# Patient Record
Sex: FEMALE | Race: WHITE | NOT HISPANIC OR LATINO
[De-identification: names, ages, dates, MRNs, and addresses within clinical notes are randomized per-mention and may not be internally consistent; named-entity substitution may affect disease eponyms.]

---

## 2019-02-14 PROBLEM — M25.561 RIGHT KNEE PAIN, UNSPECIFIED CHRONICITY: Status: ACTIVE | Noted: 2019-02-14

## 2019-02-15 ENCOUNTER — APPOINTMENT (OUTPATIENT)
Dept: ORTHOPEDIC SURGERY | Facility: CLINIC | Age: 59
End: 2019-02-15
Payer: OTHER MISCELLANEOUS

## 2019-02-15 VITALS — BODY MASS INDEX: 30.99 KG/M2 | HEIGHT: 65 IN | WEIGHT: 186 LBS

## 2019-02-15 VITALS — DIASTOLIC BLOOD PRESSURE: 85 MMHG | SYSTOLIC BLOOD PRESSURE: 127 MMHG | HEART RATE: 98 BPM

## 2019-02-15 DIAGNOSIS — M25.561 PAIN IN RIGHT KNEE: ICD-10-CM

## 2019-02-15 DIAGNOSIS — M17.11 UNILATERAL PRIMARY OSTEOARTHRITIS, RIGHT KNEE: ICD-10-CM

## 2019-02-15 DIAGNOSIS — Z87.39 PERSONAL HISTORY OF OTHER DISEASES OF THE MUSCULOSKELETAL SYSTEM AND CONNECTIVE TISSUE: ICD-10-CM

## 2019-02-15 PROCEDURE — 73562 X-RAY EXAM OF KNEE 3: CPT | Mod: RT

## 2019-02-15 PROCEDURE — 99204 OFFICE O/P NEW MOD 45 MIN: CPT

## 2019-02-15 NOTE — HISTORY OF PRESENT ILLNESS
[de-identified] : Patient presents today for initial evaluation of right knee pain. The patient reports a developing pain in the right knee as a result of sustaining a work-related injury April 21, 2012. 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 She states since that time, she followup with orthopedist. She's had corticosteroid injections as well as arthroscopy in the past year. She continues to have knee pain. She reports the knee is now not need. Ports of pain in the lateral aspect of his knee. She has great difficulties in ascending descending stairs. She cannot reciprocate. She reports with difficulties walking long distances. She is intermittently using a cane at this time. She has extensive food allergies and cannot take Tylenol for her pain because of allergy. She reports her pain is progressively worsening. She has crepitus. She is here today to discuss surgical management for knee replacement. No history of metal allergies reported. No past history of DVT or PE.\par \par Review of Systems-\par Constitutional: No fever or chills. \par Cardiovascular: No orthopnea or chest pain\par Pulmonary: No shortness of breath. \par GI: No nausea or vomiting or abdominal pain.\par Musculoskeletal: see HPI \par Psychiatric: No anxiety and depression.

## 2019-02-15 NOTE — PHYSICAL EXAM
[de-identified] : The patient appears well nourished and in no apparent distress. The patient is alert and oriented to person, place, and time. Affect and mood appear normal. The head is normocephalic and atraumatic. The eyes reveal normal sclera and extra ocular muscles are intact. The mucous membranes are moist. Skin shows normal turgor with no evidence of eczema or psoriasis. No respiratory distress noted. Sensation grossly intact. MUSCULOSKELETAL:   SEE BELOW\par \par \par Right  Knee: Small portals consistent with past arthroscopy; no large surgical scars \par Swelling: Mild\par Effusion: Minimal\par Alignment: -7° of valgus malalignment\par Tenderness: Anterior and lateral joint lines\par ROM: Flexion: 125 deg.; Extension: Zero deg,\par Laxity: There is valgus instability of 5°; no anterior posterior instability\par PF crepitus: yes;  mild pain\par Quadricep formation: intact in Extension & Flexion:\par Quad/Ham St: 5/5\par Venous stasis. No ulcerations. 2+ DP pulse. [de-identified] : X-ray of the right knee was reviewed. There is a severe clinical valgus to fracture appreciated. There is osteophyte formation as well as calcifications of the knee. There is bone-on-bone DJD of the patellofemoral joint and the lateral compartment.

## 2020-09-15 ENCOUNTER — NON-APPOINTMENT (OUTPATIENT)
Age: 60
End: 2020-09-15

## 2020-09-15 ENCOUNTER — APPOINTMENT (OUTPATIENT)
Dept: CARDIOLOGY | Facility: CLINIC | Age: 60
End: 2020-09-15
Payer: MEDICAID

## 2020-09-15 VITALS
BODY MASS INDEX: 28.82 KG/M2 | WEIGHT: 173 LBS | DIASTOLIC BLOOD PRESSURE: 80 MMHG | RESPIRATION RATE: 15 BRPM | HEIGHT: 65 IN | TEMPERATURE: 97.6 F | HEART RATE: 92 BPM | OXYGEN SATURATION: 90 % | SYSTOLIC BLOOD PRESSURE: 120 MMHG

## 2020-09-15 DIAGNOSIS — Z82.49 FAMILY HISTORY OF ISCHEMIC HEART DISEASE AND OTHER DISEASES OF THE CIRCULATORY SYSTEM: ICD-10-CM

## 2020-09-15 DIAGNOSIS — Z87.898 PERSONAL HISTORY OF OTHER SPECIFIED CONDITIONS: ICD-10-CM

## 2020-09-15 PROCEDURE — 93970 EXTREMITY STUDY: CPT

## 2020-09-15 PROCEDURE — 93306 TTE W/DOPPLER COMPLETE: CPT

## 2020-09-15 PROCEDURE — 93000 ELECTROCARDIOGRAM COMPLETE: CPT

## 2020-09-15 PROCEDURE — 99204 OFFICE O/P NEW MOD 45 MIN: CPT

## 2020-09-15 RX ORDER — LORAZEPAM 0.5 MG/1
0.5 TABLET ORAL
Refills: 0 | Status: ACTIVE | COMMUNITY

## 2020-09-15 RX ORDER — METOPROLOL TARTRATE 25 MG/1
25 TABLET, FILM COATED ORAL DAILY
Refills: 0 | Status: DISCONTINUED | COMMUNITY
End: 2020-09-15

## 2020-09-15 RX ORDER — CHROMIUM 200 MCG
TABLET ORAL
Refills: 0 | Status: DISCONTINUED | COMMUNITY
End: 2020-09-15

## 2020-09-15 RX ORDER — FAMOTIDINE 20 MG/1
20 TABLET, FILM COATED ORAL
Refills: 0 | Status: ACTIVE | COMMUNITY

## 2020-09-16 NOTE — REASON FOR VISIT
[FreeTextEntry1] : 60-year-old female present here for initial cardiac evaluation status-post a hospitalization in August with paroxysmal atrial fibrillation. \par \par The patient, who has a diagnosis of pancreatic cancer, developed severe and protracted nausea and vomiting with dehydration resulting in hospitalization for the above.  She apparently developed paroxysmal atrial fibrillation during that hospitalization, and it would seem that it spontaneously converted back to sinus rhythm.  She is not being treated with anticoagulation presumptively because of the relative low CHADS score and the underlying malignancy.  \par \par The patient has no preexisting history of any cardiovascular disease.  She does not smoke or drink.  She does not have any hypertension, diabetes, or hyperlipidemia.  There is no preexisting history of valvular disease.  \par \par Patient just began another chemo regimen being registered through the [facility] site.  Her pancreatic lesion will apparently be considered for a surgery at a later date. \par \par

## 2020-09-16 NOTE — PHYSICAL EXAM
[General Appearance - Well Developed] : well developed [Normal Appearance] : normal appearance [Normal Conjunctiva] : the conjunctiva exhibited no abnormalities [Well Groomed] : well groomed [Eyelids - No Xanthelasma] : the eyelids demonstrated no xanthelasmas [Normal Oral Mucosa] : normal oral mucosa [No Oral Cyanosis] : no oral cyanosis [No Oral Pallor] : no oral pallor [Normal Jugular Venous A Waves Present] : normal jugular venous A waves present [Normal Jugular Venous V Waves Present] : normal jugular venous V waves present [No Jugular Venous Wild A Waves] : no jugular venous wild A waves [Heart Rate And Rhythm] : heart rate and rhythm were normal [Murmurs] : no murmurs present [Heart Sounds] : normal S1 and S2 [Respiration, Rhythm And Depth] : normal respiratory rhythm and effort [Exaggerated Use Of Accessory Muscles For Inspiration] : no accessory muscle use [Auscultation Breath Sounds / Voice Sounds] : lungs were clear to auscultation bilaterally [Abdomen Soft] : soft [Abdomen Tenderness] : non-tender [] : no hepato-splenomegaly [Abdomen Mass (___ Cm)] : no abdominal mass palpated [Oriented To Time, Place, And Person] : oriented to person, place, and time [Mood] : the mood was normal [Affect] : the affect was normal [No Anxiety] : not feeling anxious [FreeTextEntry1] : Right knee with incisional scar\par 1-2+ left lower extremity edema to the knee

## 2020-09-16 NOTE — ASSESSMENT
[FreeTextEntry1] : The ECG shows normal sinus rhythm at 92 with no significant ST-T wave changes. \par \par The echocardiogram dated 8/9/20 at [facility] [place] demonstrated normal LV size and function and absent of any significant valvular heart disease or pulmonary hypertension.  \par \par Laboratory data otherwise is noncontributory/not available. \par \par Impression:\par 1.  Paroxysmal atrial fibrillation seen in the face of dehydration and possible electrolyte abnormalities. \par 2.  Structurally normal heart by echocardiography with no cardiac abnormalities seen on examination or ECG today. \par 3.  Lower extremity edema more so unilateral in the left lower extremity with a venous Doppler performed today showing no evidence of DVT.  \par \par I reassured the patient that there is no evidence of DVT and that the edema is more likely on the basis of being volume-overloaded and perhaps hypoalbuminemic.  Support stockings might be useful for this. \par \par As regards to paroxysmal atrial fibrillation that was seen in the face of the severe dehydration with nausea and vomiting, would agree with avoiding any long-term anticoagulation at this point in time. \par \par Would switch her over from short-acting to long-acting Metoprolol Succinate 25 mg once daily. \par \par Would consider use of aspirin 81 mg daily if otherwise not contraindicated.  There is no indication for any other cardiac testing at this point in time.  Patient is to return to you for ongoing medical care.  I would like to check on her again in a few months.  Feel free to contact me should you think she will be going for surgery.    \par

## 2020-10-20 ENCOUNTER — RX RENEWAL (OUTPATIENT)
Age: 60
End: 2020-10-20

## 2020-11-12 ENCOUNTER — APPOINTMENT (OUTPATIENT)
Dept: CARDIOLOGY | Facility: CLINIC | Age: 60
End: 2020-11-12
Payer: MEDICAID

## 2020-11-12 VITALS
BODY MASS INDEX: 32.32 KG/M2 | WEIGHT: 194 LBS | TEMPERATURE: 96.9 F | HEIGHT: 65 IN | RESPIRATION RATE: 16 BRPM | HEART RATE: 102 BPM | OXYGEN SATURATION: 97 % | SYSTOLIC BLOOD PRESSURE: 121 MMHG | DIASTOLIC BLOOD PRESSURE: 80 MMHG

## 2020-11-12 PROCEDURE — 93000 ELECTROCARDIOGRAM COMPLETE: CPT

## 2020-11-12 PROCEDURE — 99214 OFFICE O/P EST MOD 30 MIN: CPT

## 2020-11-12 PROCEDURE — 99072 ADDL SUPL MATRL&STAF TM PHE: CPT

## 2020-11-12 RX ORDER — METOPROLOL SUCCINATE 25 MG/1
25 TABLET, EXTENDED RELEASE ORAL DAILY
Qty: 90 | Refills: 3 | Status: DISCONTINUED | COMMUNITY
Start: 2020-09-15 | End: 2020-11-12

## 2020-11-12 NOTE — HISTORY OF PRESENT ILLNESS
[FreeTextEntry1] : Her hx is as followed.\par Newly diagnosed with  pancreatic cancer, developed severe and protracted nausea and vomiting with dehydration hospitalized.  She apparently developed PAF during that hospitalization, and spontaneously converted back to SR. She is not being treated with anticoagulation presumptively because of the relative low CHADS score and the underlying malignancy.  \par \par The patient has no preexisting history of any cardiovascular disease.  She does not smoke or drink.  She does not have any hypertension, diabetes, or hyperlipidemia.  There is no preexisting history of valvular disease.  \par

## 2020-11-12 NOTE — REASON FOR VISIT
[FreeTextEntry1] : 60-year-old female present here for [place] reevaluation for hx of  paroxysmal atrial fibrillation. \par Was initially on metoprolol tartrate.  We replaced it with metoprolol succinate\par After our last visit she developed SOB soon after starting Metoprolol succ.  \par This was changed back to tartrate and symptom resolved. \par \par More recently she was hospitalized for 2 weeks due to adverse rx to a new chemo which caused N/V.\par \par During her last tx at [facility] she was given IV Lasix for worsening l/e edema and sent home on 40 mg BID for 1 week. Potassium PO was not started. Next blood work is due next Tuesday. \par \par By her report her CT scan revealed a decrease in size  by half of her pancreatic tumor. The details of this are not available for review.

## 2020-11-12 NOTE — ASSESSMENT
[FreeTextEntry1] : ECG- ST at 104 bpm wit occ. PVCs.\par \par The echocardiogram dated 8/9/20 at [facility] [place] demonstrated normal LV size and function and absent of any significant valvular heart disease or pulmonary hypertension.  \par \par Lab data 9/15/20\par \par \par \par Impression:\par 1.  Paroxysmal atrial fibrillation seen in the face of dehydration and possible electrolyte abnormalities which has not clinically reoccurred. \par 2.  Structurally normal heart by echocardiography with no cardiac abnormalities seen on examination or ECG today. \par 3.  Worsening l/e edema and started on Lasix 40 mg BID by onc. with no PO K supplementation. Up 21 lbs since Sept. suspect that most of this  is  fluid retention. \par \par 4. Blood pressure today fine. HR ST at 104 bpm on Metoprolol tar. 25 mg daily. Last dose was taken 8 hrs ago. \par \par 5. Pancreatic CA being managed through [facility] and now tolerating new chemo regimen.\par \par 6. No SOB or chest pain.\par \par \par Plan\par 1. Increase Metoprolol Tartrate  to 25 mg BID for better HR control. \par \par 2. RX'd  Potassium  20 MEQs daily for duration of Lasix use. Would like a copy of next blood work. \par     Suspect the patient will require ongoing diuretic therapy.\par    suggested testing of an albumin in addition to a metabolic panel on follow-up next week\par \par 3.As regards to paroxysmal atrial fibrillation that was seen in the face of the severe dehydration with nausea and vomiting, would agree with avoiding any long-term anticoagulation at this point in time. \par \par \par  There is no indication for any other cardiac testing at this point in time.  \par \par Clinical follow up in 2 months.

## 2020-11-12 NOTE — PHYSICAL EXAM
[General Appearance - Well Developed] : well developed [Normal Appearance] : normal appearance [Well Groomed] : well groomed [Normal Conjunctiva] : the conjunctiva exhibited no abnormalities [Eyelids - No Xanthelasma] : the eyelids demonstrated no xanthelasmas [Normal Oral Mucosa] : normal oral mucosa [No Oral Pallor] : no oral pallor [No Oral Cyanosis] : no oral cyanosis [Normal Jugular Venous A Waves Present] : normal jugular venous A waves present [Normal Jugular Venous V Waves Present] : normal jugular venous V waves present [No Jugular Venous Wild A Waves] : no jugular venous wild A waves [Respiration, Rhythm And Depth] : normal respiratory rhythm and effort [Exaggerated Use Of Accessory Muscles For Inspiration] : no accessory muscle use [Auscultation Breath Sounds / Voice Sounds] : lungs were clear to auscultation bilaterally [Heart Rate And Rhythm] : heart rate and rhythm were normal [Heart Sounds] : normal S1 and S2 [Murmurs] : no murmurs present [Abdomen Soft] : soft [Abdomen Tenderness] : non-tender [] : no hepato-splenomegaly [Abdomen Mass (___ Cm)] : no abdominal mass palpated [Oriented To Time, Place, And Person] : oriented to person, place, and time [Affect] : the affect was normal [Mood] : the mood was normal [No Anxiety] : not feeling anxious [FreeTextEntry1] : Right knee with incisional scar.  1-2+ pretibial edema bilaterally. \par 1-2+ left lower extremity edema to the knee

## 2020-11-13 RX ORDER — POTASSIUM CHLORIDE 1.5 G/1.58G
20 POWDER, FOR SOLUTION ORAL DAILY
Qty: 30 | Refills: 0 | Status: DISCONTINUED | COMMUNITY
Start: 2020-11-12 | End: 2020-11-13

## 2021-01-04 ENCOUNTER — NON-APPOINTMENT (OUTPATIENT)
Age: 61
End: 2021-01-04

## 2021-08-10 ENCOUNTER — NON-APPOINTMENT (OUTPATIENT)
Age: 61
End: 2021-08-10

## 2021-08-12 RX ORDER — FUROSEMIDE 20 MG/1
20 TABLET ORAL DAILY
Qty: 90 | Refills: 0 | Status: DISCONTINUED | COMMUNITY
End: 2021-08-12

## 2021-08-12 RX ORDER — POTASSIUM CHLORIDE 1500 MG/1
20 TABLET, FILM COATED, EXTENDED RELEASE ORAL
Qty: 90 | Refills: 2 | Status: DISCONTINUED | COMMUNITY
Start: 2020-11-13 | End: 2021-08-12

## 2021-08-13 RX ORDER — METOPROLOL TARTRATE 25 MG/1
25 TABLET, FILM COATED ORAL DAILY
Qty: 45 | Refills: 3 | Status: ACTIVE | COMMUNITY
Start: 2020-09-24

## 2021-08-18 ENCOUNTER — APPOINTMENT (OUTPATIENT)
Dept: CARDIOLOGY | Facility: CLINIC | Age: 61
End: 2021-08-18
Payer: MEDICAID

## 2021-08-18 VITALS
HEART RATE: 76 BPM | RESPIRATION RATE: 16 BRPM | BODY MASS INDEX: 19.99 KG/M2 | WEIGHT: 120 LBS | SYSTOLIC BLOOD PRESSURE: 110 MMHG | OXYGEN SATURATION: 97 % | HEIGHT: 65 IN | DIASTOLIC BLOOD PRESSURE: 80 MMHG

## 2021-08-18 DIAGNOSIS — R60.0 LOCALIZED EDEMA: ICD-10-CM

## 2021-08-18 DIAGNOSIS — R53.1 WEAKNESS: ICD-10-CM

## 2021-08-18 DIAGNOSIS — I48.91 UNSPECIFIED ATRIAL FIBRILLATION: ICD-10-CM

## 2021-08-18 DIAGNOSIS — I48.0 PAROXYSMAL ATRIAL FIBRILLATION: ICD-10-CM

## 2021-08-18 DIAGNOSIS — R06.02 SHORTNESS OF BREATH: ICD-10-CM

## 2021-08-18 PROCEDURE — 93000 ELECTROCARDIOGRAM COMPLETE: CPT

## 2021-08-18 PROCEDURE — 99214 OFFICE O/P EST MOD 30 MIN: CPT

## 2021-08-18 RX ORDER — OLANZAPINE 2.5 MG/1
2.5 TABLET, FILM COATED ORAL
Refills: 0 | Status: DISCONTINUED | COMMUNITY
End: 2021-08-18

## 2021-08-18 NOTE — HISTORY OF PRESENT ILLNESS
[FreeTextEntry1] : Her hx is as followed.\par Diagnosed with  pancreatic cancer, developed severe and protracted nausea and vomiting with dehydration hospitalized.  She apparently developed PAF during that hospitalization, and spontaneously converted back to SR. She is not being treated with anticoagulation presumptively because of the relative low CHADS score and the underlying malignancy.  \par Does not believe that she had recurrent atrial fibrillation perioperatively or during her most recent chemotherapy\par \par The patient has no preexisting history of any cardiovascular disease.  She does not smoke or drink.  She does not have any hypertension, diabetes, or hyperlipidemia.  There is no preexisting history of valvular disease.  \par

## 2021-08-18 NOTE — REASON FOR VISIT
[FreeTextEntry1] : 60-year-old female with a history of pancreatic cancer, presents here for cardiac reevaluation for hx of  paroxysmal atrial fibrillation. \par Since her last visit here, she has undergone a Whipple procedure January and subsequent 5 months of chemotherapy.\par Weight in November 2020  194 pounds.\par Reported weight in May was 140 pounds.\par She has had profuse diarrhea for a few weeks and drop nearly another 20 pounds to 120 pounds.\par Patient contacted us last week with complaints of weakness dizziness and low blood pressure at which point we cut her metoprolol dosing from 25 mg to 1/2 tablet or 12.5 mg daily with improvement.\par \par There has been a 9 month lapse in care which included a successful  Whipple w/o any cardiac events and reinstitution of chemo which has now completed.  Believes that she had a brief episode"fluttering" sensation early after her DC but this has not reoccurred. \par \par Weight loss seems to be pretty substantial.In May she weighed 140 lbs, during this month she had a bout of GI issues and lost approx. 10 lbs.  Due to low BPs ,fatigue and dizziness we decreased her Metoprolol ER 25 mg QD to 12.5 mg QD which she responded to well on 8/11/21.\par -Now drinking a protein shakes\par -Believes  weaning off of Olanzapine may have attributed to her GI symptoms. \par \par By her report she is in remission and plans on returning to work next month. She olds the position as .\par \par Lasix was DC'd\par \par

## 2021-08-18 NOTE — PHYSICAL EXAM
[Well Groomed] : well groomed [Normal Conjunctiva] : the conjunctiva exhibited no abnormalities [Eyelids - No Xanthelasma] : the eyelids demonstrated no xanthelasmas [Normal Oral Mucosa] : normal oral mucosa [No Oral Pallor] : no oral pallor [No Oral Cyanosis] : no oral cyanosis [Normal Jugular Venous A Waves Present] : normal jugular venous A waves present [Normal Jugular Venous V Waves Present] : normal jugular venous V waves present [No Jugular Venous Wild A Waves] : no jugular venous wild A waves [Respiration, Rhythm And Depth] : normal respiratory rhythm and effort [Exaggerated Use Of Accessory Muscles For Inspiration] : no accessory muscle use [Auscultation Breath Sounds / Voice Sounds] : lungs were clear to auscultation bilaterally [Heart Sounds] : normal S1 and S2 [Heart Rate And Rhythm] : heart rate and rhythm were normal [Murmurs] : no murmurs present [Abdomen Soft] : soft [Abdomen Tenderness] : non-tender [] : no hepato-splenomegaly [Abdomen Mass (___ Cm)] : no abdominal mass palpated [Oriented To Time, Place, And Person] : oriented to person, place, and time [Affect] : the affect was normal [Mood] : the mood was normal [No Anxiety] : not feeling anxious [FreeTextEntry1] : Right knee with incisional scar.  1+ ankle edema bilaterally\par 1-2+ left lower extremity edema to the knee

## 2021-08-18 NOTE — ASSESSMENT
[FreeTextEntry1] : ECG- ST at 76 bpm .  Normal axis intervals no significant ST-T wave changes\par \par The echocardiogram dated 8/9/20 at [facility] [place] demonstrated normal LV size and function and absent of any significant valvular heart disease or pulmonary hypertension.  \par \par Lab data 9/15/20\par \par \par \par Impression:\par 1.  Paroxysmal atrial fibrillation seen in the face of dehydration and possible electrolyte abnormalities which has not clinically reoccurred. \par - One brief short lived episode of fluttering post op from Whipple.\par -Stress seems to induce her PAF\par -S/P Whipple and tolerated well by her report. \par \par 2.  Structurally normal heart by echocardiography with no cardiac abnormalities seen on examination or ECG today. \par 3.  Edema is mild and she is no longer on diuretic therapy.  Blood pressure would not sustain diuretic at this time\par \par 4. 74 lb weight loss likely the cause for her hypotension, dizziness and fatigue. This has all improved with decreasing her beta blocker to 12.5 mg QD\par \par 5. Pancreatic CA being managed through [facility] [place] s/p chemo.\par \par 6. No SOB or chest pain.\par \par \par Plan\par 1. C/W Metoprolol ER 12.5mg QD\par \par 2.  Continue with caloric and protein shakes to help gain weight. \par       \par 3.As regards to paroxysmal atrial fibrillation that was seen in the face of the severe dehydration with nausea/vomiting and possibly post operatively but never confirmed  as it was short lived while recovering at home would agree with avoiding any long-term anticoagulation at this point in time. \par \par \par  There is no indication for any other cardiac testing at this point in time.  \par \par Clinical follow up in 6 months.

## 2022-01-26 ENCOUNTER — APPOINTMENT (OUTPATIENT)
Dept: GASTROENTEROLOGY | Facility: CLINIC | Age: 62
End: 2022-01-26
Payer: MEDICAID

## 2022-01-26 VITALS
WEIGHT: 122 LBS | HEIGHT: 65 IN | BODY MASS INDEX: 20.33 KG/M2 | HEART RATE: 80 BPM | SYSTOLIC BLOOD PRESSURE: 113 MMHG | DIASTOLIC BLOOD PRESSURE: 80 MMHG

## 2022-01-26 PROCEDURE — 99204 OFFICE O/P NEW MOD 45 MIN: CPT

## 2022-01-26 RX ORDER — CHOLESTYRAMINE 4 G/9G
4 POWDER, FOR SUSPENSION ORAL DAILY
Qty: 1 | Refills: 3 | Status: ACTIVE | COMMUNITY
Start: 2022-01-26 | End: 1900-01-01

## 2022-01-26 NOTE — PHYSICAL EXAM
[Auscultation Breath Sounds / Voice Sounds] : lungs were clear to auscultation bilaterally [Heart Rate And Rhythm] : heart rate was normal and rhythm regular [Heart Sounds] : normal S1 and S2 [Heart Sounds Gallop] : no gallops [Murmurs] : no murmurs [Heart Sounds Pericardial Friction Rub] : no pericardial rub [Bowel Sounds] : normal bowel sounds [Abdomen Soft] : soft [Abdomen Tenderness] : non-tender [] : no hepato-splenomegaly [Abdomen Mass (___ Cm)] : no abdominal mass palpated [FreeTextEntry1] : thin female

## 2022-01-26 NOTE — ASSESSMENT
[FreeTextEntry1] : 62 yo female with history of chronic diarrhea after resection for pancreatic cancer. Will obtain stool specimen s including elastase, C diff, GI PCR. Will review outside labs, and initiate therapy with cholestyramine (patient had problems with aspartame supplements in prior rx). Follow up in one to two weeks. Will most likely need aggressive enzyme supplementation. Patient was instructed on physiology and usage of pancreatic enzymes.

## 2022-01-26 NOTE — HISTORY OF PRESENT ILLNESS
[de-identified] : Ms. KAVITA GAMINO is a 61 year old female with history of pancreatic cancer status post Whipple surgery and Atrovent chemotherapy. Patient has been having problems with diarrhea and inability to gain weight. Patient has also noted bloating and dyspepsia. Patient had been treated with low doses of pancreatic enzymes. Patient feels she was having side effects including shakiness related to the pancreatic enzymes. There's been no bleeding. Patient does note some nocturnal diarrhea. GERD has not been a major concern.\par

## 2022-02-04 ENCOUNTER — APPOINTMENT (OUTPATIENT)
Dept: GASTROENTEROLOGY | Facility: CLINIC | Age: 62
End: 2022-02-04

## 2022-02-04 LAB
C DIFF TOXIN B QL PCR REFLEX: NORMAL
GDH ANTIGEN: NOT DETECTED
TOXIN A AND B: NOT DETECTED

## 2022-02-08 LAB — PANCREATIC ELASTASE, FECAL: <50

## 2022-02-11 RX ORDER — PANCRELIPASE LIPASE, PANCRELIPASE PROTEASE, PANCRELIPASE AMYLASE 10000; 32000; 42000 [USP'U]/1; [USP'U]/1; [USP'U]/1
10000-32000 CAPSULE, DELAYED RELEASE ORAL
Qty: 120 | Refills: 4 | Status: ACTIVE | COMMUNITY
Start: 2022-02-11 | End: 1900-01-01

## 2022-02-16 RX ORDER — PANCRELIPASE LIPASE, PANCRELIPASE AMYLASE, AND PANCRELIPASE PROTEASE 21000; 83900; 54700 [USP'U]/1; [USP'U]/1; [USP'U]/1
21000-54700 CAPSULE, DELAYED RELEASE ORAL
Qty: 120 | Refills: 4 | Status: ACTIVE | COMMUNITY
Start: 2022-02-16 | End: 1900-01-01

## 2023-04-27 ENCOUNTER — RX CHANGE (OUTPATIENT)
Age: 63
End: 2023-04-27

## 2023-04-28 ENCOUNTER — RX CHANGE (OUTPATIENT)
Age: 63
End: 2023-04-28

## 2023-05-04 ENCOUNTER — APPOINTMENT (OUTPATIENT)
Dept: GASTROENTEROLOGY | Facility: CLINIC | Age: 63
End: 2023-05-04

## 2023-05-10 ENCOUNTER — APPOINTMENT (OUTPATIENT)
Dept: GASTROENTEROLOGY | Facility: CLINIC | Age: 63
End: 2023-05-10
Payer: MEDICAID

## 2023-05-10 DIAGNOSIS — K86.89 OTHER SPECIFIED DISEASES OF PANCREAS: ICD-10-CM

## 2023-05-10 PROCEDURE — 99443: CPT

## 2023-05-10 RX ORDER — PANCRELIPASE 30000; 6000; 19000 [USP'U]/1; [USP'U]/1; [USP'U]/1
6000-19000 CAPSULE, DELAYED RELEASE PELLETS ORAL
Qty: 180 | Refills: 6 | Status: ACTIVE | COMMUNITY
Start: 2023-05-10 | End: 1900-01-01

## 2023-05-10 NOTE — ASSESSMENT
[FreeTextEntry1] : 62 yo female with history of pancreatic insufficiency postoperatively and subsequent diarrhea.  Patient's symptoms have been well controlled on Pancreaze.  Will attempt trial of Creon at equivalent dose.  Patient to call back to assess response.\par \par Thirty five minutes were spent in telephone consultation with patient.

## 2023-05-10 NOTE — HISTORY OF PRESENT ILLNESS
[FreeTextEntry1] : Ms. KAVITA GAMINO is a 63 year old female with history of glottic insufficiency and diarrhea after Whipple procedure for pancreatic cancer.  Patient was seen in the office once in January 2022.  At that time, documentation of pancreatic insufficiency by stool elastase and symptoms was identified.  Patient reports having had "reactions" in the past to other pancreatic enzymes.  These were prior to her evaluation here.  Patient was started on Pancreaze and has had good control of her symptoms.  Weight has remained stable.\par

## 2023-05-10 NOTE — REASON FOR VISIT
[Home] : at home, [unfilled] , at the time of the visit. [Medical Office: ([facility])___] : at the medical office located in  [Verbal consent obtained from patient] : the patient, [unfilled] [Consultation] : a consultation visit [FreeTextEntry1] : pancreatic insuffiency

## 2023-06-22 ENCOUNTER — APPOINTMENT (OUTPATIENT)
Dept: GASTROENTEROLOGY | Facility: CLINIC | Age: 63
End: 2023-06-22
Payer: MEDICAID

## 2023-06-22 VITALS
BODY MASS INDEX: 25.49 KG/M2 | HEIGHT: 65 IN | SYSTOLIC BLOOD PRESSURE: 133 MMHG | DIASTOLIC BLOOD PRESSURE: 60 MMHG | WEIGHT: 153 LBS | HEART RATE: 89 BPM

## 2023-06-22 DIAGNOSIS — K52.9 NONINFECTIVE GASTROENTERITIS AND COLITIS, UNSPECIFIED: ICD-10-CM

## 2023-06-22 DIAGNOSIS — C25.9 MALIGNANT NEOPLASM OF PANCREAS, UNSPECIFIED: ICD-10-CM

## 2023-06-22 PROCEDURE — 99213 OFFICE O/P EST LOW 20 MIN: CPT

## 2023-06-23 ENCOUNTER — RX CHANGE (OUTPATIENT)
Age: 63
End: 2023-06-23

## 2023-06-23 PROBLEM — C25.9 PANCREATIC CANCER: Status: ACTIVE | Noted: 2020-09-15

## 2023-06-23 PROBLEM — K52.9 CHRONIC DIARRHEA: Status: ACTIVE | Noted: 2022-01-26

## 2023-06-23 NOTE — HISTORY OF PRESENT ILLNESS
[FreeTextEntry1] : Ms. KAVITA GAMINO is a 63 year old female with history of pancreatic cancer status post Whipple resection.  Patient has had ongoing issues with malabsorption.  Patient was able to obtain pancreatic enzymes and has been feeling well.  Patient's weight has been trending up.  By report, patient had recent CAT scan that showed no evidence of recurrent disease.  Importance of maintaining pancreatic enzymes at current dose discussed with patient.\par

## 2023-06-23 NOTE — PHYSICAL EXAM

## 2023-06-23 NOTE — ASSESSMENT
[FreeTextEntry1] : 62 yo female with history of pancreatic cancer status post resection and history of malabsorption.  Patient currently feels well on current pancreatic enzyme supplementation.  Continue medication.  Patient will have old records sent over.  Follow-up three months.

## 2023-06-28 ENCOUNTER — RX CHANGE (OUTPATIENT)
Age: 63
End: 2023-06-28

## 2023-09-11 ENCOUNTER — APPOINTMENT (OUTPATIENT)
Dept: ORTHOPEDIC SURGERY | Facility: CLINIC | Age: 63
End: 2023-09-11

## 2023-10-01 PROBLEM — K86.89 PANCREATIC MASS: Status: ACTIVE | Noted: 2020-09-15

## 2023-11-30 ENCOUNTER — APPOINTMENT (OUTPATIENT)
Dept: GASTROENTEROLOGY | Facility: CLINIC | Age: 63
End: 2023-11-30

## 2024-02-20 ENCOUNTER — RX RENEWAL (OUTPATIENT)
Age: 64
End: 2024-02-20

## 2024-02-20 RX ORDER — PANCRELIPASE LIPASE, PANCRELIPASE AMYLASE, AND PANCRELIPASE PROTEASE 10500; 61500; 35500 [USP'U]/1; [USP'U]/1; [USP'U]/1
10500-35500 CAPSULE, DELAYED RELEASE ORAL
Qty: 90 | Refills: 5 | Status: ACTIVE | COMMUNITY
Start: 2022-02-17 | End: 1900-01-01

## 2024-05-17 ENCOUNTER — INPATIENT (INPATIENT)
Facility: HOSPITAL | Age: 64
LOS: 3 days | Discharge: HOME | DRG: 440 | End: 2024-05-21
Attending: STUDENT IN AN ORGANIZED HEALTH CARE EDUCATION/TRAINING PROGRAM | Admitting: STUDENT IN AN ORGANIZED HEALTH CARE EDUCATION/TRAINING PROGRAM
Payer: MEDICAID

## 2024-05-17 VITALS
OXYGEN SATURATION: 98 % | RESPIRATION RATE: 18 BRPM | TEMPERATURE: 98 F | WEIGHT: 152.56 LBS | DIASTOLIC BLOOD PRESSURE: 78 MMHG | HEART RATE: 69 BPM | SYSTOLIC BLOOD PRESSURE: 154 MMHG

## 2024-05-17 DIAGNOSIS — K85.90 ACUTE PANCREATITIS WITHOUT NECROSIS OR INFECTION, UNSPECIFIED: ICD-10-CM

## 2024-05-17 DIAGNOSIS — K21.9 GASTRO-ESOPHAGEAL REFLUX DISEASE W/OUT ESOPHAGITIS: ICD-10-CM

## 2024-05-17 LAB
ALBUMIN SERPL ELPH-MCNC: 4 G/DL — SIGNIFICANT CHANGE UP (ref 3.3–5.2)
ALP SERPL-CCNC: 149 U/L — HIGH (ref 40–120)
ALT FLD-CCNC: 30 U/L — SIGNIFICANT CHANGE UP
ANION GAP SERPL CALC-SCNC: 14 MMOL/L — SIGNIFICANT CHANGE UP (ref 5–17)
APPEARANCE UR: CLEAR — SIGNIFICANT CHANGE UP
AST SERPL-CCNC: 27 U/L — SIGNIFICANT CHANGE UP
BACTERIA # UR AUTO: NEGATIVE /HPF — SIGNIFICANT CHANGE UP
BASE EXCESS BLDV CALC-SCNC: 4.8 MMOL/L — HIGH (ref -2–3)
BASOPHILS # BLD AUTO: 0.03 K/UL — SIGNIFICANT CHANGE UP (ref 0–0.2)
BASOPHILS NFR BLD AUTO: 0.3 % — SIGNIFICANT CHANGE UP (ref 0–2)
BILIRUB SERPL-MCNC: 0.4 MG/DL — SIGNIFICANT CHANGE UP (ref 0.4–2)
BILIRUB UR-MCNC: NEGATIVE — SIGNIFICANT CHANGE UP
BUN SERPL-MCNC: 12.6 MG/DL — SIGNIFICANT CHANGE UP (ref 8–20)
CA-I SERPL-SCNC: 1.13 MMOL/L — LOW (ref 1.15–1.33)
CALCIUM SERPL-MCNC: 9.2 MG/DL — SIGNIFICANT CHANGE UP (ref 8.4–10.5)
CAST: 1 /LPF — SIGNIFICANT CHANGE UP (ref 0–4)
CHLORIDE BLDV-SCNC: 99 MMOL/L — SIGNIFICANT CHANGE UP (ref 96–108)
CHLORIDE SERPL-SCNC: 99 MMOL/L — SIGNIFICANT CHANGE UP (ref 96–108)
CHOLEST SERPL-MCNC: 176 MG/DL — SIGNIFICANT CHANGE UP
CO2 SERPL-SCNC: 27 MMOL/L — SIGNIFICANT CHANGE UP (ref 22–29)
COLOR SPEC: YELLOW — SIGNIFICANT CHANGE UP
CREAT SERPL-MCNC: 0.69 MG/DL — SIGNIFICANT CHANGE UP (ref 0.5–1.3)
DIFF PNL FLD: ABNORMAL
EGFR: 96 ML/MIN/1.73M2 — SIGNIFICANT CHANGE UP
EOSINOPHIL # BLD AUTO: 0 K/UL — SIGNIFICANT CHANGE UP (ref 0–0.5)
EOSINOPHIL NFR BLD AUTO: 0 % — SIGNIFICANT CHANGE UP (ref 0–6)
GAS PNL BLDV: 134 MMOL/L — LOW (ref 136–145)
GAS PNL BLDV: SIGNIFICANT CHANGE UP
GAS PNL BLDV: SIGNIFICANT CHANGE UP
GLUCOSE BLDV-MCNC: 126 MG/DL — HIGH (ref 70–99)
GLUCOSE SERPL-MCNC: 119 MG/DL — HIGH (ref 70–99)
GLUCOSE UR QL: NEGATIVE MG/DL — SIGNIFICANT CHANGE UP
HCO3 BLDV-SCNC: 30 MMOL/L — HIGH (ref 22–29)
HCT VFR BLD CALC: 43.4 % — SIGNIFICANT CHANGE UP (ref 34.5–45)
HCT VFR BLDA CALC: 45 % — SIGNIFICANT CHANGE UP
HDLC SERPL-MCNC: 73 MG/DL — SIGNIFICANT CHANGE UP
HGB BLD CALC-MCNC: 14.9 G/DL — SIGNIFICANT CHANGE UP (ref 11.7–16.1)
HGB BLD-MCNC: 14.7 G/DL — SIGNIFICANT CHANGE UP (ref 11.5–15.5)
IMM GRANULOCYTES NFR BLD AUTO: 0.5 % — SIGNIFICANT CHANGE UP (ref 0–0.9)
KETONES UR-MCNC: ABNORMAL MG/DL
LACTATE BLDV-MCNC: 1.2 MMOL/L — SIGNIFICANT CHANGE UP (ref 0.5–2)
LEUKOCYTE ESTERASE UR-ACNC: NEGATIVE — SIGNIFICANT CHANGE UP
LIDOCAIN IGE QN: 442 U/L — HIGH (ref 22–51)
LIPID PNL WITH DIRECT LDL SERPL: 93 MG/DL — SIGNIFICANT CHANGE UP
LYMPHOCYTES # BLD AUTO: 0.8 K/UL — LOW (ref 1–3.3)
LYMPHOCYTES # BLD AUTO: 6.7 % — LOW (ref 13–44)
MCHC RBC-ENTMCNC: 29.8 PG — SIGNIFICANT CHANGE UP (ref 27–34)
MCHC RBC-ENTMCNC: 33.9 GM/DL — SIGNIFICANT CHANGE UP (ref 32–36)
MCV RBC AUTO: 87.9 FL — SIGNIFICANT CHANGE UP (ref 80–100)
MONOCYTES # BLD AUTO: 0.56 K/UL — SIGNIFICANT CHANGE UP (ref 0–0.9)
MONOCYTES NFR BLD AUTO: 4.7 % — SIGNIFICANT CHANGE UP (ref 2–14)
NEUTROPHILS # BLD AUTO: 10.42 K/UL — HIGH (ref 1.8–7.4)
NEUTROPHILS NFR BLD AUTO: 87.8 % — HIGH (ref 43–77)
NITRITE UR-MCNC: NEGATIVE — SIGNIFICANT CHANGE UP
NON HDL CHOLESTEROL: 103 MG/DL — SIGNIFICANT CHANGE UP
PCO2 BLDV: 55 MMHG — HIGH (ref 39–42)
PH BLDV: 7.35 — SIGNIFICANT CHANGE UP (ref 7.32–7.43)
PH UR: 6 — SIGNIFICANT CHANGE UP (ref 5–8)
PLATELET # BLD AUTO: 216 K/UL — SIGNIFICANT CHANGE UP (ref 150–400)
PO2 BLDV: 42 MMHG — SIGNIFICANT CHANGE UP (ref 25–45)
POTASSIUM BLDV-SCNC: 4 MMOL/L — SIGNIFICANT CHANGE UP (ref 3.5–5.1)
POTASSIUM SERPL-MCNC: 4.1 MMOL/L — SIGNIFICANT CHANGE UP (ref 3.5–5.3)
POTASSIUM SERPL-SCNC: 4.1 MMOL/L — SIGNIFICANT CHANGE UP (ref 3.5–5.3)
PROT SERPL-MCNC: 7.5 G/DL — SIGNIFICANT CHANGE UP (ref 6.6–8.7)
PROT UR-MCNC: NEGATIVE MG/DL — SIGNIFICANT CHANGE UP
RBC # BLD: 4.94 M/UL — SIGNIFICANT CHANGE UP (ref 3.8–5.2)
RBC # FLD: 12.4 % — SIGNIFICANT CHANGE UP (ref 10.3–14.5)
RBC CASTS # UR COMP ASSIST: 4 /HPF — SIGNIFICANT CHANGE UP (ref 0–4)
SAO2 % BLDV: 72.3 % — SIGNIFICANT CHANGE UP
SODIUM SERPL-SCNC: 139 MMOL/L — SIGNIFICANT CHANGE UP (ref 135–145)
SP GR SPEC: >1.03 — HIGH (ref 1–1.03)
SQUAMOUS # UR AUTO: 2 /HPF — SIGNIFICANT CHANGE UP (ref 0–5)
TRIGL SERPL-MCNC: 52 MG/DL — SIGNIFICANT CHANGE UP
UROBILINOGEN FLD QL: 0.2 MG/DL — SIGNIFICANT CHANGE UP (ref 0.2–1)
WBC # BLD: 11.87 K/UL — HIGH (ref 3.8–10.5)
WBC # FLD AUTO: 11.87 K/UL — HIGH (ref 3.8–10.5)
WBC UR QL: 2 /HPF — SIGNIFICANT CHANGE UP (ref 0–5)

## 2024-05-17 PROCEDURE — 74177 CT ABD & PELVIS W/CONTRAST: CPT | Mod: 26,MC

## 2024-05-17 PROCEDURE — 99285 EMERGENCY DEPT VISIT HI MDM: CPT

## 2024-05-17 PROCEDURE — 99223 1ST HOSP IP/OBS HIGH 75: CPT

## 2024-05-17 PROCEDURE — 93010 ELECTROCARDIOGRAM REPORT: CPT

## 2024-05-17 RX ORDER — ACETAMINOPHEN 500 MG
650 TABLET ORAL EVERY 6 HOURS
Refills: 0 | Status: DISCONTINUED | OUTPATIENT
Start: 2024-05-17 | End: 2024-05-21

## 2024-05-17 RX ORDER — FAMOTIDINE 20 MG/1
20 TABLET, FILM COATED ORAL
Qty: 30 | Refills: 3 | Status: ACTIVE | COMMUNITY
Start: 2024-05-17 | End: 1900-01-01

## 2024-05-17 RX ORDER — LANOLIN ALCOHOL/MO/W.PET/CERES
3 CREAM (GRAM) TOPICAL AT BEDTIME
Refills: 0 | Status: DISCONTINUED | OUTPATIENT
Start: 2024-05-17 | End: 2024-05-21

## 2024-05-17 RX ORDER — ONDANSETRON 8 MG/1
4 TABLET, FILM COATED ORAL EVERY 8 HOURS
Refills: 0 | Status: DISCONTINUED | OUTPATIENT
Start: 2024-05-17 | End: 2024-05-19

## 2024-05-17 RX ORDER — NALOXONE HYDROCHLORIDE 4 MG/.1ML
0.4 SPRAY NASAL ONCE
Refills: 0 | Status: DISCONTINUED | OUTPATIENT
Start: 2024-05-17 | End: 2024-05-21

## 2024-05-17 RX ORDER — SODIUM CHLORIDE 9 MG/ML
1000 INJECTION INTRAMUSCULAR; INTRAVENOUS; SUBCUTANEOUS ONCE
Refills: 0 | Status: COMPLETED | OUTPATIENT
Start: 2024-05-17 | End: 2024-05-17

## 2024-05-17 RX ORDER — ONDANSETRON 8 MG/1
4 TABLET, FILM COATED ORAL ONCE
Refills: 0 | Status: COMPLETED | OUTPATIENT
Start: 2024-05-17 | End: 2024-05-17

## 2024-05-17 RX ORDER — SODIUM CHLORIDE 9 MG/ML
1000 INJECTION, SOLUTION INTRAVENOUS
Refills: 0 | Status: DISCONTINUED | OUTPATIENT
Start: 2024-05-17 | End: 2024-05-18

## 2024-05-17 RX ORDER — MORPHINE SULFATE 50 MG/1
4 CAPSULE, EXTENDED RELEASE ORAL ONCE
Refills: 0 | Status: DISCONTINUED | OUTPATIENT
Start: 2024-05-17 | End: 2024-05-17

## 2024-05-17 RX ORDER — POLYETHYLENE GLYCOL 3350 17 G/17G
17 POWDER, FOR SOLUTION ORAL DAILY
Refills: 0 | Status: DISCONTINUED | OUTPATIENT
Start: 2024-05-17 | End: 2024-05-21

## 2024-05-17 RX ORDER — SENNA PLUS 8.6 MG/1
2 TABLET ORAL AT BEDTIME
Refills: 0 | Status: DISCONTINUED | OUTPATIENT
Start: 2024-05-17 | End: 2024-05-21

## 2024-05-17 RX ORDER — MORPHINE SULFATE 50 MG/1
4 CAPSULE, EXTENDED RELEASE ORAL EVERY 4 HOURS
Refills: 0 | Status: DISCONTINUED | OUTPATIENT
Start: 2024-05-17 | End: 2024-05-20

## 2024-05-17 RX ORDER — MORPHINE SULFATE 50 MG/1
2 CAPSULE, EXTENDED RELEASE ORAL EVERY 4 HOURS
Refills: 0 | Status: DISCONTINUED | OUTPATIENT
Start: 2024-05-17 | End: 2024-05-20

## 2024-05-17 RX ADMIN — ONDANSETRON 4 MILLIGRAM(S): 8 TABLET, FILM COATED ORAL at 20:00

## 2024-05-17 RX ADMIN — ONDANSETRON 4 MILLIGRAM(S): 8 TABLET, FILM COATED ORAL at 23:42

## 2024-05-17 RX ADMIN — MORPHINE SULFATE 4 MILLIGRAM(S): 50 CAPSULE, EXTENDED RELEASE ORAL at 20:00

## 2024-05-17 RX ADMIN — MORPHINE SULFATE 4 MILLIGRAM(S): 50 CAPSULE, EXTENDED RELEASE ORAL at 23:42

## 2024-05-17 RX ADMIN — ONDANSETRON 4 MILLIGRAM(S): 8 TABLET, FILM COATED ORAL at 17:43

## 2024-05-17 RX ADMIN — SODIUM CHLORIDE 1000 MILLILITER(S): 9 INJECTION INTRAMUSCULAR; INTRAVENOUS; SUBCUTANEOUS at 17:44

## 2024-05-17 NOTE — H&P ADULT - NSHPLABSRESULTS_GEN_ALL_CORE
CT ABDPEL IVC IMPRESSION:  Status post Whipple procedure. Peripancreatic fat stranding, compatible   with acute pancreatitis.  1.2 cm pancreatic cystic lesion which can be further evaluated on MRI.  Indeterminate 6 mm right lower lobe pulmonary nodule. Recommend   correlation with prior imaging if available for comparison.

## 2024-05-17 NOTE — H&P ADULT - ASSESSMENT
ASSESSMENT:  64F with PMHX Pancreatic CA s/p Whipple s/p Chemo ([place]) in remission x3 years, HTN presents to [facility] ER c/o sudden onset mid abdominal pain radiating to her L flank associated with N/V admitted for Acute on Chronic Pancreatitis c/b pancreatic cystic lesion.    PLAN:  Acute on Chronic Pancreatitis  -CT ABDPEL IVC +peripancreatic fat stranding +acute pancreatitis +pancreatic cystic lesion  -Lipase >400  -Admit to Tele  -Repeat Labs  -Trend Lipase  -s/p Whipple Procedure  -1L IVFB in ED  -IVF LR 120cc/hr  -Morphine IV PRN  -VTE PPX SCDs/LMWH  -Clear Liquids  -Restart Pancreaze DR 10,500 BID when diet advanced  -Daughter to bring recent CT records from [place] in AM  -GI consulted for 1.2cm pancreatic cystic lesion    HTN  -Metoprolol Tartrate 12.5mg BID     Pancreatic CA  -Whipple procedure at [facility] in [place] 3-1/2 years ago had subsequent chemotherapy remission x3 years

## 2024-05-17 NOTE — ED ADULT TRIAGE NOTE - CHIEF COMPLAINT QUOTE
Left lower back pain with nausea vomiting and burning to GI tract since last night. in remission for pancreatic cancer. hx kidney stones

## 2024-05-17 NOTE — ED ADULT TRIAGE NOTE - BP NONINVASIVE DIASTOLIC (MM HG)
[place] [employer]  50y Male   [id number]    Procedure: S/P LEFT ARM FISTULOGRAM  BALLOON ANGIOPLASTY OF VENOUS OUTFLOW 3/20, S/P RIGHT BKA 3/8  S/P RT BKA COMPLETION 3/13  Patient is a 50y old  Male who presents with a chief complaint of right foot/ankle necrotizing fasiitis (13 Mar 2018 07:29)    PAST MEDICAL & SURGICAL HISTORY:  Blind  HTN (hypertension)  End stage renal disease  Diabetes    Events of the Last 24h: Pt had panic attack yesterday evening, responded well to IV ativan.   Vital Signs Last 24 Hrs  T(C): 36.3 (24 Mar 2018 21:14), Max: 36.3 (24 Mar 2018 05:05)  T(F): 97.4 (24 Mar 2018 21:14), Max: 97.4 (24 Mar 2018 21:14)  HR: 70 (24 Mar 2018 21:14) (70 - 76)  BP: 160/65 (24 Mar 2018 21:14) (117/51 - 160/65)  BP(mean): --  RR: 18 (24 Mar 2018 21:14) (18 - 18)  SpO2: --        Diet, Consistent Carbohydrate Renal/No Snacks (18 @ 17:23)      I&O's Summary    23 Mar 2018 07:  -  24 Mar 2018 07:00  --------------------------------------------------------  IN: 240 mL / OUT: 3176 mL / NET: -2936 mL    24 Mar 2018 07:  -  25 Mar 2018 01:22  --------------------------------------------------------  IN: 240 mL / OUT: 100 mL / NET: 140 mL     I&O's Detail    23 Mar 2018 07:  -  24 Mar 2018 07:00  --------------------------------------------------------  IN:    Oral Fluid: 240 mL  Total IN: 240 mL    OUT:    Other: 3000 mL    Stool: 1 mL    Voided: 175 mL  Total OUT: 3176 mL    Total NET: -2936 mL      24 Mar 2018 07:01  -  25 Mar 2018 01:22  --------------------------------------------------------  IN:    Oral Fluid: 240 mL  Total IN: 240 mL    OUT:    Voided: 100 mL  Total OUT: 100 mL    Total NET: 140 mL          MEDICATIONS  (STANDING):  artificial  tears Solution 1 Drop(s) Both EYES every 12 hours  enalapril 20 milliGRAM(s) Oral two times a day  epoetin keagan Injectable 43223 Unit(s) IV Push <User Schedule>  furosemide    Tablet 80 milliGRAM(s) Oral two times a day  insulin lispro (HumaLOG) corrective regimen sliding scale   SubCutaneous every 4 hours  meropenem  IVPB 500 milliGRAM(s) IV Intermittent every 24 hours  metolazone 5 milliGRAM(s) Oral daily  metoprolol tartrate 25 milliGRAM(s) Oral two times a day  nystatin Powder 1 Application(s) Topical two times a day  pregabalin 100 milliGRAM(s) Oral two times a day  sertraline 25 milliGRAM(s) Oral daily  sevelamer hydrochloride 2400 milliGRAM(s) Oral three times a day  simvastatin 10 milliGRAM(s) Oral at bedtime  vancomycin  IVPB 1000 milliGRAM(s) IV Intermittent <User Schedule>    MEDICATIONS  (PRN):  ALPRAZolam 0.25 milliGRAM(s) Oral daily PRN anxiety  morphine  - Injectable 4 milliGRAM(s) IV Push every 6 hours PRN Severe Pain (7 - 10)  oxyCODONE    5 mG/acetaminophen 325 mG 2 Tablet(s) Oral every 4 hours PRN Mild Pain (1 - 3)      PHYSICAL EXAM:  GENERAL: NAD  HEENT: NCAT  CHEST/LUNGS: CTAB  HEART: RRR,  No murmurs, rubs, or gallops  ABDOMEN: SNTND  EXTREMITIES:  right bka, LLE w/o edema/ cyanosis  NEURO: partial blindness, motor/sensation grossly intact  SKIN: No rashes or lesions  INCISION/WOUNDS: RLE bka, stump dressed, c/d/i                          9.6    14.03 )-----------( 317      ( 24 Mar 2018 13:43 )             33.4        CBC Full  -  ( 24 Mar 2018 13:43 )  WBC Count : 14.03 K/uL  Hemoglobin : 9.6 g/dL  Hematocrit : 33.4 %  Platelet Count - Automated : 317 K/uL  Mean Cell Volume : 87.2 fL  Mean Cell Hemoglobin : 25.1 pg  Mean Cell Hemoglobin Concentration : 28.7 g/dL  Auto Neutrophil # : 8.48 K/uL  Auto Lymphocyte # : 2.08 K/uL  Auto Monocyte # : 0.91 K/uL  Auto Eosinophil # : 2.22 K/uL  Auto Basophil # : 0.26 K/uL  Auto Neutrophil % : 60.4 %  Auto Lymphocyte % : 14.8 %  Auto Monocyte % : 6.5 %  Auto Eosinophil % : 15.8 %  Auto Basophil % : 1.9 %               145   |  100   |  34                 Ca: 8.7    BMP:   ----------------------------< 162    M.6   (18 @ 13:43)             4.6    |  32    | 7.4                Ph: 5.4 78

## 2024-05-17 NOTE — ED PROVIDER NOTE - ATTENDING CONTRIBUTION TO CARE
64-year-old female with history of pancreatic cancer status post Whipple procedure at [facility] in [place] 3-1/2 years ago  had subsequent chemotherapy presents to ED complaining of epigastric pain today which she describes as similar to her prior presentation when she was diagnosed with pancreatic cancer.  Patient complaining of increased nausea with multiple episodes of vomiting.  No reported fever or diarrhea.   on exam patient awake and alert appears uncomfortable,  PERRL, mucous members slightly dry, , neck supple, heart regular, lungs clear bilaterally,  positive mild epigastric tenderness to palpation no rebound rigidity, extremities full range of motion, neuro no focal deficits.  Will check labs based on history check CT abdomen pelvis with oral and IV contrast if possible.  Patient states she last had CAT scan performed at [facility] in March which was negative for any recurrence

## 2024-05-17 NOTE — ED ADULT NURSE NOTE - ED STAT RN HANDOFF DETAILS
Report given to CDU THOR Harper. Pt is resting in stretcher comfortably at this time, no apparent distress noted at this time. Pt safety maintained. Pt denies any complaints at this time.

## 2024-05-17 NOTE — ED PROVIDER NOTE - PHYSICAL EXAMINATION
General: well appearing, NAD  Head: NC, AT  EENT: EOMI, no scleral icterus  Cardiac: RRR, no apparent murmurs, no lower extremity edema  Respiratory: CTABL, no respiratory distress   Abdomen: soft, ND, TTP epigastric region, nonperitonitic, no CVA TTP  MSK/Vascular: full ROM, soft compartments, warm extremities  Neuro: AAOx3, sensation to light touch intact  Psych: calm, cooperative

## 2024-05-17 NOTE — ED PROVIDER NOTE - CLINICAL SUMMARY MEDICAL DECISION MAKING FREE TEXT BOX
63 y/o female w/hx of pancreatic CA in remission s/p whippple and chemotherapy presenting with one day of epigastric abdominal pain with radiation to the back after eating, hx appears consistent with acute pancreatitis. Lipase > 400 on initial labs. Pending CT Abd/Pelv with IV contrast. IVF, Morphine, Zofran given.

## 2024-05-17 NOTE — ED PROVIDER NOTE - OBJECTIVE STATEMENT
63 y/o female w/PMHx of Pancreatic CA s/p Whipple and chemotherapy presenting to the ER w/sudden onset mid abdominal burning pain w/radiation to her L flank, nausea/vomiting since last night after dinner. Pt has not eaten since, had a little bit of water that exacerbated symptoms. Had a CT done 3 months ago of her abdomen and was told it was normal. Denies fever/chills, CP, sob, urinary sx.

## 2024-05-17 NOTE — H&P ADULT - NSHPPHYSICALEXAM_GEN_ALL_CORE
Vital Signs Last 24 Hrs  T(C): 37 (17 May 2024 23:36), Max: 37 (17 May 2024 23:36)  T(F): 98.6 (17 May 2024 23:36), Max: 98.6 (17 May 2024 23:36)  HR: 86 (17 May 2024 23:36) (69 - 100)  BP: 150/85 (17 May 2024 23:36) (139/82 - 178/87)  BP(mean): 101 (17 May 2024 20:53) (101 - 101)  RR: 18 (17 May 2024 23:36) (18 - 18)  SpO2: 98% (17 May 2024 23:36) (98% - 98%)    Parameters below as of 17 May 2024 23:36  Patient On (Oxygen Delivery Method): room air    Constitutional: Well-appearing, NAD, VSS  Head: NC/AT  Eyes: PERRL, EOMI, anicteric sclera, conjunctiva WNL  ENT: Normal Pharynx, MMM, No tonsillar exudate/erythema  Neck: Supple, Non-tender  Chest: Non-tender, no rashes  Cardio: RRR, s1/s2, no appreciable murmurs/rubs/gallops  Resp: BS CTA bilaterally, no wheezing/rhonchi/rales  Abd: Soft, Non-tender, Non-distended, no rebound/guarding/rigidity  : not examined  Rectal: not examined  MSK: moving all extremities, no motor weakness, full ROM x4  Ext: palpable distal pulses, good capillary refill, no clubbing/cyanosis/edema  Psych: appropriate, cooperative  Neuro: CN II-XII grossly intact, no focal deficits  Skin: Warm/Dry. No rashes.

## 2024-05-17 NOTE — H&P ADULT - HISTORY OF PRESENT ILLNESS
Pt seen/examined prior to midnight on 5/17/24.    64F with PMHX Pancreatic CA s/p Whipple s/p Chemo ([place]) in remission x3 years, HTN presents to [facility] ER c/o sudden onset mid abdominal pain radiating to her L flank associated with N/V. Unable to tolerate PO since. Labs significant for elevated Lipase. CT ABDPEL IVC +Acute Pancreatitis +pancreatic cystic lesion 1.2cm. Recent CTABDPEL at [facility] purportedly normal per patient daughter will bring records in the morning. Med rec confirmed. Orders placed. Pain improved with Morphine.     ROS negative unless mentioned.    PMHX: Pancreatic CA s/p Whipple s/p Chemo ([place]) in remission x3 years, HTN  PSHX: Whipple Surgery, R TKR  FamHx: Denies fam hx HTN  Social Hx: Denies etoh/tobacco/drug abuse  Allergies: Aleve, Compazine, PCN

## 2024-05-18 LAB
ALBUMIN SERPL ELPH-MCNC: 3.6 G/DL — SIGNIFICANT CHANGE UP (ref 3.3–5.2)
ALP SERPL-CCNC: 128 U/L — HIGH (ref 40–120)
ALT FLD-CCNC: 24 U/L — SIGNIFICANT CHANGE UP
ANION GAP SERPL CALC-SCNC: 12 MMOL/L — SIGNIFICANT CHANGE UP (ref 5–17)
AST SERPL-CCNC: 21 U/L — SIGNIFICANT CHANGE UP
BASOPHILS # BLD AUTO: 0.03 K/UL — SIGNIFICANT CHANGE UP (ref 0–0.2)
BASOPHILS NFR BLD AUTO: 0.3 % — SIGNIFICANT CHANGE UP (ref 0–2)
BILIRUB SERPL-MCNC: 0.6 MG/DL — SIGNIFICANT CHANGE UP (ref 0.4–2)
BUN SERPL-MCNC: 10.7 MG/DL — SIGNIFICANT CHANGE UP (ref 8–20)
CALCIUM SERPL-MCNC: 8.8 MG/DL — SIGNIFICANT CHANGE UP (ref 8.4–10.5)
CHLORIDE SERPL-SCNC: 98 MMOL/L — SIGNIFICANT CHANGE UP (ref 96–108)
CO2 SERPL-SCNC: 28 MMOL/L — SIGNIFICANT CHANGE UP (ref 22–29)
CREAT SERPL-MCNC: 0.64 MG/DL — SIGNIFICANT CHANGE UP (ref 0.5–1.3)
EGFR: 98 ML/MIN/1.73M2 — SIGNIFICANT CHANGE UP
EOSINOPHIL # BLD AUTO: 0 K/UL — SIGNIFICANT CHANGE UP (ref 0–0.5)
EOSINOPHIL NFR BLD AUTO: 0 % — SIGNIFICANT CHANGE UP (ref 0–6)
GLUCOSE SERPL-MCNC: 104 MG/DL — HIGH (ref 70–99)
HCT VFR BLD CALC: 40 % — SIGNIFICANT CHANGE UP (ref 34.5–45)
HGB BLD-MCNC: 12.7 G/DL — SIGNIFICANT CHANGE UP (ref 11.5–15.5)
IMM GRANULOCYTES NFR BLD AUTO: 0.6 % — SIGNIFICANT CHANGE UP (ref 0–0.9)
LIDOCAIN IGE QN: 260 U/L — HIGH (ref 22–51)
LYMPHOCYTES # BLD AUTO: 1.6 K/UL — SIGNIFICANT CHANGE UP (ref 1–3.3)
LYMPHOCYTES # BLD AUTO: 15.7 % — SIGNIFICANT CHANGE UP (ref 13–44)
MAGNESIUM SERPL-MCNC: 1.7 MG/DL — SIGNIFICANT CHANGE UP (ref 1.6–2.6)
MCHC RBC-ENTMCNC: 28.5 PG — SIGNIFICANT CHANGE UP (ref 27–34)
MCHC RBC-ENTMCNC: 31.8 GM/DL — LOW (ref 32–36)
MCV RBC AUTO: 89.9 FL — SIGNIFICANT CHANGE UP (ref 80–100)
MONOCYTES # BLD AUTO: 0.79 K/UL — SIGNIFICANT CHANGE UP (ref 0–0.9)
MONOCYTES NFR BLD AUTO: 7.8 % — SIGNIFICANT CHANGE UP (ref 2–14)
NEUTROPHILS # BLD AUTO: 7.71 K/UL — HIGH (ref 1.8–7.4)
NEUTROPHILS NFR BLD AUTO: 75.6 % — SIGNIFICANT CHANGE UP (ref 43–77)
PHOSPHATE SERPL-MCNC: 3.6 MG/DL — SIGNIFICANT CHANGE UP (ref 2.4–4.7)
PLATELET # BLD AUTO: 190 K/UL — SIGNIFICANT CHANGE UP (ref 150–400)
POTASSIUM SERPL-MCNC: 3.6 MMOL/L — SIGNIFICANT CHANGE UP (ref 3.5–5.3)
POTASSIUM SERPL-SCNC: 3.6 MMOL/L — SIGNIFICANT CHANGE UP (ref 3.5–5.3)
PROT SERPL-MCNC: 6.7 G/DL — SIGNIFICANT CHANGE UP (ref 6.6–8.7)
RBC # BLD: 4.45 M/UL — SIGNIFICANT CHANGE UP (ref 3.8–5.2)
RBC # FLD: 12.3 % — SIGNIFICANT CHANGE UP (ref 10.3–14.5)
SODIUM SERPL-SCNC: 138 MMOL/L — SIGNIFICANT CHANGE UP (ref 135–145)
TRIGL SERPL-MCNC: 50 MG/DL — SIGNIFICANT CHANGE UP
WBC # BLD: 10.19 K/UL — SIGNIFICANT CHANGE UP (ref 3.8–10.5)
WBC # FLD AUTO: 10.19 K/UL — SIGNIFICANT CHANGE UP (ref 3.8–10.5)

## 2024-05-18 PROCEDURE — 99233 SBSQ HOSP IP/OBS HIGH 50: CPT

## 2024-05-18 PROCEDURE — 99223 1ST HOSP IP/OBS HIGH 75: CPT

## 2024-05-18 RX ORDER — METOPROLOL TARTRATE 50 MG
12.5 TABLET ORAL
Refills: 0 | Status: DISCONTINUED | OUTPATIENT
Start: 2024-05-18 | End: 2024-05-19

## 2024-05-18 RX ORDER — METOPROLOL TARTRATE 50 MG
0.5 TABLET ORAL
Refills: 0 | DISCHARGE

## 2024-05-18 RX ORDER — LIPASE/PROTEASE/AMYLASE 16-48-48K
1 CAPSULE,DELAYED RELEASE (ENTERIC COATED) ORAL
Refills: 0 | DISCHARGE

## 2024-05-18 RX ORDER — SODIUM CHLORIDE 9 MG/ML
1000 INJECTION, SOLUTION INTRAVENOUS
Refills: 0 | Status: DISCONTINUED | OUTPATIENT
Start: 2024-05-18 | End: 2024-05-20

## 2024-05-18 RX ORDER — ACETAMINOPHEN 500 MG
1000 TABLET ORAL ONCE
Refills: 0 | Status: COMPLETED | OUTPATIENT
Start: 2024-05-18 | End: 2024-05-18

## 2024-05-18 RX ADMIN — MORPHINE SULFATE 2 MILLIGRAM(S): 50 CAPSULE, EXTENDED RELEASE ORAL at 05:27

## 2024-05-18 RX ADMIN — Medication 12.5 MILLIGRAM(S): at 05:24

## 2024-05-18 RX ADMIN — ONDANSETRON 4 MILLIGRAM(S): 8 TABLET, FILM COATED ORAL at 12:59

## 2024-05-18 RX ADMIN — SODIUM CHLORIDE 120 MILLILITER(S): 9 INJECTION, SOLUTION INTRAVENOUS at 12:59

## 2024-05-18 RX ADMIN — SODIUM CHLORIDE 120 MILLILITER(S): 9 INJECTION, SOLUTION INTRAVENOUS at 03:11

## 2024-05-18 RX ADMIN — ONDANSETRON 4 MILLIGRAM(S): 8 TABLET, FILM COATED ORAL at 21:01

## 2024-05-18 RX ADMIN — Medication 1000 MILLIGRAM(S): at 22:01

## 2024-05-18 RX ADMIN — SODIUM CHLORIDE 150 MILLILITER(S): 9 INJECTION, SOLUTION INTRAVENOUS at 15:57

## 2024-05-18 RX ADMIN — Medication 400 MILLIGRAM(S): at 21:01

## 2024-05-18 NOTE — CONSULT NOTE ADULT - SUBJECTIVE AND OBJECTIVE BOX
HISTORY OF PRESENT ILLNESS: 64F with PMHX Pancreatic CA s/p Whipple s/p Chemo (January 2021 - at [facility] [place]) in remission x3 years, HTN presented to ED with sudden onset of mid abdominal pain to her L flank and associated nausea and dry heaving. Abdominal pain was started Thursday (04/16/24) night  2 hours after dinner, initially attributed to gas pain, took Simethicone x 2 doses with no relief. Patient reports compliance with follow ups and her last CT scan with [facility] was reportedly normal. She was seen by her PCP early this week and her routine blood work were within normal limits at that time. Patient reported generalized weakness. Denies fever, chills, diarrhea. Denies ETOH use or smoking. Previous EGD (3 years ago) was reportedly normal and she never had a colonoscopy in the past. Her gastroenterologist is Dr. Narayanan ([facility]). In ED, CT abd shows acute pancreatitis and a 1.2 cm cystic lesion in the distal body. at Lab reports significant for Lipase 442 on arrival, (260 this morning). Mild leucocytosis 12. Patient remains afebrile.     Review of Systems:  . Constitutional: No fever, chills  . HEENT: Negative  · Respiratory and Thorax: No shortness of breath, no cough  · Cardiovascular: No chest pain, palpitation, no dizziness  · Gastrointestinal: see above  · Genitourinary: No hematuria  · Musculoskeletal: Negative  · Neurological: negative  · Psychiatric: no agitation, no anxiety      PAST MEDICAL/SURGICAL HISTORY:    SOCIAL HISTORY:  - TOBACCO: Denies  - ALCOHOL: Denies  - ILLICIT DRUG USE: Denies    FAMILY HISTORY:  No known history of gastrointestinal or liver disease;      HOME MEDICATIONS:  metoprolol tartrate 25 mg oral tablet: 0.5 tab(s) orally 2 times a day (18 May 2024 00:21)  Pancreaze 10,500 units-61,500 units-35,500 units oral delayed release capsule: 1 cap(s) orally 2 times a day (18 May 2024 00:22)    INPATIENT MEDICATIONS:  MEDICATIONS  (STANDING):  lactated ringers. 1000 milliLiter(s) (120 mL/Hr) IV Continuous <Continuous>  metoprolol tartrate 12.5 milliGRAM(s) Oral two times a day  naloxone Injectable 0.4 milliGRAM(s) IV Push once  polyethylene glycol 3350 17 Gram(s) Oral daily  senna 2 Tablet(s) Oral at bedtime    MEDICATIONS  (PRN):  acetaminophen     Tablet .. 650 milliGRAM(s) Oral every 6 hours PRN Temp greater or equal to 38C (100.4F), Mild Pain (1 - 3)  aluminum hydroxide/magnesium hydroxide/simethicone Suspension 30 milliLiter(s) Oral every 4 hours PRN Dyspepsia  bisacodyl 5 milliGRAM(s) Oral daily PRN Constipation  melatonin 3 milliGRAM(s) Oral at bedtime PRN Insomnia  morphine  - Injectable 2 milliGRAM(s) IV Push every 4 hours PRN Moderate Pain (4 - 6)  morphine  - Injectable 4 milliGRAM(s) IV Push every 4 hours PRN Severe Pain (7 - 10)  ondansetron Injectable 4 milliGRAM(s) IV Push every 8 hours PRN Nausea and/or Vomiting    ALLERGIES:  Compazine (Other)  Aleve (Hives)  penicillin (Swelling)    T(C): 36.8 (05-18-24 @ 05:22), Max: 37 (05-17-24 @ 23:36)  HR: 86 (05-18-24 @ 05:22) (69 - 100)  BP: 105/64 (05-18-24 @ 05:22) (105/64 - 178/87)  RR: 16 (05-18-24 @ 05:22) (16 - 18)  SpO2: 96% (05-18-24 @ 05:22) (96% - 99%)      PHYSICAL EXAM:    Constitutional: No acute distress  Neuro: Awake alert, oriented  HEENT: anicteric sclerae  CV: regular rate, regular rhythm  Pulm/chest: lung sounds diminished bilaterally, no accessory muscle use noted  Abd: soft, nontender, nondistended +bowel sounds. No rigidity, rebound tenderness, or guarding  Ext: no edema  Skin: warm, no jaundice   Psych: calm, cooperative      LABS:             12.7   10.19 )-----------( 190      ( 05-18 @ 04:08 )             40.0                14.7   11.87 )-----------( 216      ( 05-17 @ 17:34 )             43.4         05-18    138  |  98  |  10.7  ----------------------------<  104<H>  3.6   |  28.0  |  0.64    Ca    8.8      18 May 2024 04:08  Phos  3.6     05-18  Mg     1.7     05-18    TPro  6.7  /  Alb  3.6  /  TBili  0.6  /  DBili  x   /  AST  21  /  ALT  24  /  AlkPhos  128<H>  05-18    LIVER FUNCTIONS - ( 18 May 2024 04:08 )  Alb: 3.6 g/dL / Pro: 6.7 g/dL / ALK PHOS: 128 U/L / ALT: 24 U/L / AST: 21 U/L / GGT: x             Lipase: 260 U/L (05-18-24 @ 04:08)  Lipase: 442 U/L (05-17-24 @ 17:34)    Urinalysis Basic - ( 18 May 2024 04:08 )    Color: x / Appearance: x / SG: x / pH: x  Gluc: 104 mg/dL / Ketone: x  / Bili: x / Urobili: x   Blood: x / Protein: x / Nitrite: x   Leuk Esterase: x / RBC: x / WBC x   Sq Epi: x / Non Sq Epi: x / Bacteria: x      < from: CT Abdomen and Pelvis w/ IV Cont (05.17.24 @ 19:58) >    FINDINGS:  LOWER CHEST: 6 mm nodule in the right lower lobe (3, 17).    LIVER: Within normal limits.  BILE DUCTS: Pneumobilia. Trace intrahepatic biliary duct dilatation.  GALLBLADDER: Cholecystectomy.  SPLEEN: Within normal limits.  PANCREAS: Status post Whipple procedure. Atrophic pancreatic remnant with   mild prominence of the pancreatic duct. 1.2 cm cystic lesion in the   distal body. Peripancreatic fat stranding, compatible with acute   pancreatitis.  ADRENALS: Within normal limits.  KIDNEYS/URETERS: Within normal limits.    BLADDER: Within normal limits.  REPRODUCTIVE ORGANS: Uterus and adnexa within normal limits.    BOWEL: No bowel obstruction. Appendix is normal. Gastrojejunostomy.  PERITONEUM: No ascites.  VESSELS: Atherosclerotic changes.  RETROPERITONEUM/LYMPH NODES: No lymphadenopathy.  ABDOMINAL WALL: Fat-containing supraumbilical hernia.  BONES: Degenerative changes.    IMPRESSION:  Status post Whipple procedure. Peripancreatic fat stranding, compatible   with acute pancreatitis.    1.2 cm pancreatic cystic lesion which can be further evaluated on MRI.    Indeterminate 6 mm right lower lobe pulmonary nodule. Recommend   correlation with prior imaging if available for comparison.    < end of copied text >   HISTORY OF PRESENT ILLNESS: 64F with PMHX Pancreatic CA s/p Whipple s/p [place] [facility] [place], HTN presented to ED with sudden onset of mid abdominal pain to her L flank and associated nausea and dry heaving. Abdominal pain was started Thursday (04/16/24) night  2 hours after dinner, initially attributed to gas pain, took Simethicone x 2 doses with no relief. Patient reports compliance with follow ups and her last [place] with [facility] was [place] normal. She was seen by her PCP early this week and her routine blood work were within normal limits at that time. Patient reported generalized weakness. Denies fever, chills, diarrhea. Denies ETOH use or smoking. Previous EGD (3 years ago) was reportedly normal and she never had a colonoscopy in the past. Her gastroenterologist is Dr. Narayanan ([facility]). In ED, CT abd shows acute pancreatitis and a 1.2 cm cystic lesion in the distal body. at Lab reports significant for Lipase 442 on arrival, (260 this morning). Mild leucocytosis 12. Patient remains afebrile.     Review of Systems:  . Constitutional: No fever, chills  . HEENT: Negative  · Respiratory and Thorax: No shortness of breath, no cough  · Cardiovascular: No chest pain, palpitation, no dizziness  · Gastrointestinal: see above  · Genitourinary: No hematuria  · Musculoskeletal: Negative  · Neurological: negative  · Psychiatric: no agitation, no anxiety      PAST MEDICAL/SURGICAL HISTORY:    SOCIAL HISTORY:  - TOBACCO: Denies  - ALCOHOL: Denies  - ILLICIT DRUG USE: Denies    FAMILY HISTORY:  No known history of gastrointestinal or liver disease;      HOME MEDICATIONS:  metoprolol tartrate 25 mg oral tablet: 0.5 tab(s) orally 2 times a day (18 May 2024 00:21)  Pancreaze 10,500 units-61,500 units-35,500 units oral delayed release capsule: 1 cap(s) orally 2 times a day (18 May 2024 00:22)    INPATIENT MEDICATIONS:  MEDICATIONS  (STANDING):  lactated ringers. 1000 milliLiter(s) (120 mL/Hr) IV Continuous <Continuous>  metoprolol tartrate 12.5 milliGRAM(s) Oral two times a day  naloxone Injectable 0.4 milliGRAM(s) IV Push once  polyethylene glycol 3350 17 Gram(s) Oral daily  senna 2 Tablet(s) Oral at bedtime    MEDICATIONS  (PRN):  acetaminophen     Tablet .. 650 milliGRAM(s) Oral every 6 hours PRN Temp greater or equal to 38C (100.4F), Mild Pain (1 - 3)  aluminum hydroxide/magnesium hydroxide/simethicone Suspension 30 milliLiter(s) Oral every 4 hours PRN Dyspepsia  bisacodyl 5 milliGRAM(s) Oral daily PRN Constipation  melatonin 3 milliGRAM(s) Oral at bedtime PRN Insomnia  morphine  - Injectable 2 milliGRAM(s) IV Push every 4 hours PRN Moderate Pain (4 - 6)  morphine  - Injectable 4 milliGRAM(s) IV Push every 4 hours PRN Severe Pain (7 - 10)  ondansetron Injectable 4 milliGRAM(s) IV Push every 8 hours PRN Nausea and/or Vomiting    ALLERGIES:  Compazine (Other)  Aleve (Hives)  penicillin (Swelling)    T(C): 36.8 (05-18-24 @ 05:22), Max: 37 (05-17-24 @ 23:36)  HR: 86 (05-18-24 @ 05:22) (69 - 100)  BP: 105/64 (05-18-24 @ 05:22) (105/64 - 178/87)  RR: 16 (05-18-24 @ 05:22) (16 - 18)  SpO2: 96% (05-18-24 @ 05:22) (96% - 99%)      PHYSICAL EXAM:    Constitutional: No acute distress  Neuro: Awake alert, oriented  HEENT: anicteric sclerae  CV: regular rate, regular rhythm  Pulm/chest: lung sounds diminished bilaterally, no accessory muscle use noted  Abd: soft, nontender, nondistended +bowel sounds. No rigidity, rebound tenderness, or guarding  Ext: no edema  Skin: warm, no jaundice   Psych: calm, cooperative      LABS:             12.7   10.19 )-----------( 190      ( 05-18 @ 04:08 )             40.0                14.7   11.87 )-----------( 216      ( 05-17 @ 17:34 )             43.4         05-18    138  |  98  |  10.7  ----------------------------<  104<H>  3.6   |  28.0  |  0.64    Ca    8.8      18 May 2024 04:08  Phos  3.6     05-18  Mg     1.7     05-18    TPro  6.7  /  Alb  3.6  /  TBili  0.6  /  DBili  x   /  AST  21  /  ALT  24  /  AlkPhos  128<H>  05-18    LIVER FUNCTIONS - ( 18 May 2024 04:08 )  Alb: 3.6 g/dL / Pro: 6.7 g/dL / ALK PHOS: 128 U/L / ALT: 24 U/L / AST: 21 U/L / GGT: x             Lipase: 260 U/L (05-18-24 @ 04:08)  Lipase: 442 U/L (05-17-24 @ 17:34)    Urinalysis Basic - ( 18 May 2024 04:08 )    Color: x / Appearance: x / SG: x / pH: x  Gluc: 104 mg/dL / Ketone: x  / Bili: x / Urobili: x   Blood: x / Protein: x / Nitrite: x   Leuk Esterase: x / RBC: x / WBC x   Sq Epi: x / Non Sq Epi: x / Bacteria: x      < from: CT Abdomen and Pelvis w/ IV Cont (05.17.24 @ 19:58) >    FINDINGS:  LOWER CHEST: 6 mm nodule in the right lower lobe (3, 17).    LIVER: Within normal limits.  BILE DUCTS: Pneumobilia. Trace intrahepatic biliary duct dilatation.  GALLBLADDER: Cholecystectomy.  SPLEEN: Within normal limits.  PANCREAS: Status post Whipple procedure. Atrophic pancreatic remnant with   mild prominence of the pancreatic duct. 1.2 cm cystic lesion in the   distal body. Peripancreatic fat stranding, compatible with acute   pancreatitis.  ADRENALS: Within normal limits.  KIDNEYS/URETERS: Within normal limits.    BLADDER: Within normal limits.  REPRODUCTIVE ORGANS: Uterus and adnexa within normal limits.    BOWEL: No bowel obstruction. Appendix is normal. Gastrojejunostomy.  PERITONEUM: No ascites.  VESSELS: Atherosclerotic changes.  RETROPERITONEUM/LYMPH NODES: No lymphadenopathy.  ABDOMINAL WALL: Fat-containing supraumbilical hernia.  BONES: Degenerative changes.    IMPRESSION:  Status post Whipple procedure. Peripancreatic fat stranding, compatible   with acute pancreatitis.    1.2 cm pancreatic cystic lesion which can be further evaluated on MRI.    Indeterminate 6 mm right lower lobe pulmonary nodule. Recommend   correlation with prior imaging if available for comparison.    < end of copied text >

## 2024-05-18 NOTE — CONSULT NOTE ADULT - ASSESSMENT
64F with PMHX Pancreatic CA s/p Whipple s/p Chemo (January 2021 [place] [facility] [place] in [place] x3 years, HTN presented to ED with sudden onset of mid abdominal pain to her L flank and associated nausea and dry heaving.    CT abd shows acute pancreatitis and a 1.2 cm cystic lesion in the distal body. at Lab reports significant for Lipase 442 on arrival, (260 this morning). Mild leucocytosis 12. Patient remains afebrile.      64F with PMHX Pancreatic CA s/p Whipple [place] [facility] [place], HTN presented to ED with sudden onset of mid abdominal pain to her L flank and associated nausea and dry heaving. GI consulted after radiology finding of  acute pancreatitis and pancreatic cystic lesion.     Acute pancreatitis  Pancreatic cystic lesion      CT abd shows acute pancreatitis and a 1.2 cm cystic lesion in the distal body.    Lipase 442 on arrival, (260 this morning). Mild leucocytosis 12. Patient remains afebrile. Patient denies use of alcohol, normal triglycerides, normal blood glucose level. No gall stones. Patient s/p cholecystectomy. Trace intrahepatic dilation and pneumobilia is normal finding post cholecystectomy. Liver enzymes normal.    IV fluids,  ml/ hour x first 48 hours  Monitor volume status, monitor urine output  Currently NPO, can start clear liquid diet as tolerated  Cont home dose Creon with meals  Analgesics as needed, bowel regimen to avoid constipation while receiving opioids  Trend CBC, CMP, CRP, and coags    DVT prophylaxis, encourage ambulation as tolerated  - CA 19-9

## 2024-05-18 NOTE — PROGRESS NOTE ADULT - ASSESSMENT
64F with PMHX Pancreatic CA s/p Whipple s/p Chemo ([place]) in remission x3 years, HTN presents to [facility] ER c/o sudden onset mid abdominal pain radiating to her L flank associated with N/V admitted for Acute on Chronic Pancreatitis c/b pancreatic cystic lesion.    Acute on Chronic Pancreatitis  -CT ABDPEL IVC +peripancreatic fat stranding +acute pancreatitis +pancreatic cystic lesion  -Trend Lipase  -s/p Whipple Procedure  -IVF  -Morphine IV PRN  -Clear Liquids  -Restart Pancreaze DR 10,500 BID when diet advanced  -Prior CT from March without mention of pancreatic cyst  -GI consulted for 1.2cm pancreatic cystic lesion    HTN  -Metoprolol Tartrate 12.5mg BID     Pancreatic CA  -Whipple procedure at [facility] in [place] 3-1/2 years ago had subsequent chemotherapy remission x3 years    Discussed with  at bedside and updated

## 2024-05-18 NOTE — CONSULT NOTE ADULT - NS ATTEND AMEND GEN_ALL_CORE FT
H I S T O R Y   Patient Identification  Huong Currie is a 83 year old female.    Patient information was obtained from relative(s).  History/Exam limitations: dementia.  Patient presented to the Emergency Department by private vehicle.    Chief Complaint   Patient presents with   • Chest Pain Adult   • Extremity Weakness   • Leg Pain       HPI    Huong Currie is a 83 year old female presenting to the emergency department today for evaluation of chest pain.  Per son patient did not get out of bed this morning when she did she had bilateral leg weakness and pain.  History is limited as patient states that she does not know why she is here and she denies any current pain.    Past Medical History:   Diagnosis Date   • A-fib (CMS/HCC)    • Essential (primary) hypertension    • Stroke (CMS/HCC)    • Uncomplicated senile dementia (CMS/HCC)        ALLERGIES:   Allergen Reactions   • Aspirin Other (See Comments)   • Enalapril Other (See Comments)       Social History     Substance and Sexual Activity   Alcohol Use Not Currently     Social History     Tobacco Use   Smoking Status Never Smoker   Smokeless Tobacco Never Used     History   Drug Use Not on file       R E V I E W  O F  S Y M P T O M S     Review of Systems   Unable to perform ROS: Dementia         P H Y S I C A L  E X A M     Vitals with min/max:      Vital Last Value 24 Hour Range   Temperature 98.6 °F (37 °C) (07/22/21 1407) Temp  Min: 98.6 °F (37 °C)  Max: 98.6 °F (37 °C)   Pulse 74 (07/22/21 2040) Pulse  Min: 67  Max: 76   Respiratory 20 (07/22/21 2040) Resp  Min: 18  Max: 20   Non-Invasive  Blood Pressure (!) 148/72 (07/22/21 2040) BP  Min: 130/65  Max: 148/72   Pulse Oximetry 98 % (07/22/21 2040) SpO2  Min: 98 %  Max: 100 %   Arterial   Blood Pressure   No data recorded       Physical Exam  Vitals and nursing note reviewed.   Constitutional:       Appearance: She is well-developed.   HENT:      Head: Normocephalic and atraumatic.      Mouth/Throat:       Mouth: Mucous membranes are moist.   Eyes:      Extraocular Movements: Extraocular movements intact.      Pupils: Pupils are equal, round, and reactive to light.   Cardiovascular:      Rate and Rhythm: Normal rate and regular rhythm.      Pulses: Normal pulses.      Heart sounds: Normal heart sounds.   Pulmonary:      Effort: Pulmonary effort is normal.      Breath sounds: Normal breath sounds.   Abdominal:      General: Abdomen is flat. Bowel sounds are normal.      Palpations: Abdomen is soft.      Tenderness: There is no abdominal tenderness.   Musculoskeletal:      Cervical back: Normal range of motion and neck supple.      Comments: Full range of motion of bilateral upper extremities    Pelvis stable    Able to hold up left leg against gravity, unable to hold right leg against gravity   Skin:     General: Skin is warm and dry.      Capillary Refill: Capillary refill takes less than 2 seconds.      Findings: No rash.   Neurological:      Mental Status: She is alert.      Comments: Pleasantly demented, speech is clear, follows commands appropriately   Psychiatric:         Thought Content: Thought content normal.           M E D I C A L  D E C I S I O N  M A K I N G     Huong Currie is a 83 year old female presenting to the ED with hx and physical as above.     Multiple differential diagnoses were considered for this patient.  Discussed with patient plan of care including diagnostics and therapeutics.  Patient agrees to course of care.  Will continue to monitor and update.     D I A G N O S T I C  R E S U L T S    Normal sinus rhythm at a ventricular rate of 68, , , QTc 446, left anterior fascicular block, LVH present, isolated J-point elevation noted in V2, T wave inversions in inferior lateral leads  Previous EKG from April 2017 appears unchanged    Results for orders placed or performed during the hospital encounter of 07/22/21   Comprehensive Metabolic Panel   Result Value Ref Range     Fasting Status      Sodium 142 135 - 145 mmol/L    Potassium 4.1 3.4 - 5.1 mmol/L    Chloride 109 (H) 98 - 107 mmol/L    Carbon Dioxide 22 21 - 32 mmol/L    Anion Gap 15 10 - 20 mmol/L    Glucose 80 65 - 99 mg/dL    BUN 17 6 - 20 mg/dL    Creatinine 1.51 (H) 0.51 - 0.95 mg/dL    Glomerular Filtration Rate 36 (L) >90 mL/min/1.73m2    BUN/ Creatinine Ratio 11 7 - 25    Calcium 9.2 8.4 - 10.2 mg/dL    Bilirubin, Total 1.3 (H) 0.2 - 1.0 mg/dL    GOT/AST 25 <=37 Units/L    GPT/ALT 16 <64 Units/L    Alkaline Phosphatase 59 45 - 117 Units/L    Albumin 3.7 3.6 - 5.1 g/dL    Protein, Total 7.6 6.4 - 8.2 g/dL    Globulin 3.9 2.0 - 4.0 g/dL    A/G Ratio 0.9 (L) 1.0 - 2.4   Troponin I Ultra Sensitive   Result Value Ref Range    Troponin I, Ultra Sensitive <0.02 <=0.04 ng/mL   CBC with Automated Differential (performable only)   Result Value Ref Range    WBC 10.6 4.2 - 11.0 K/mcL    RBC 4.00 4.00 - 5.20 mil/mcL    HGB 11.4 (L) 12.0 - 15.5 g/dL    HCT 34.6 (L) 36.0 - 46.5 %    MCV 86.5 78.0 - 100.0 fl    MCH 28.5 26.0 - 34.0 pg    MCHC 32.9 32.0 - 36.5 g/dL    RDW-CV 13.7 11.0 - 15.0 %    RDW-SD 43.8 39.0 - 50.0 fL     (L) 140 - 450 K/mcL    NRBC 0 <=0 /100 WBC    Neutrophil, Percent 86 %    Lymphocytes, Percent 7 %    Mono, Percent 5 %    Eosinophils, Percent 0 %    Basophils, Percent 1 %    Immature Granulocytes 1 %    Absolute Neutrophils 9.1 (H) 1.8 - 7.7 K/mcL    Absolute Lymphocytes 0.8 (L) 1.0 - 4.0 K/mcL    Absolute Monocytes 0.5 0.3 - 0.9 K/mcL    Absolute Eosinophils  0.0 0.0 - 0.5 K/mcL    Absolute Basophils 0.1 0.0 - 0.3 K/mcL    Absolute Immmature Granulocytes 0.1 0.0 - 0.2 K/mcL   Troponin I Ultra Sensitive   Result Value Ref Range    Troponin I, Ultra Sensitive <0.02 <=0.04 ng/mL       CT HEAD WO CONTRAST   Final Result    No clear evidence of acute intracranial process. Prior operative changes   and cystic encephalomalacia as described.      Electronically Signed by: SUKH ARELLANO MD    Signed on:  7/22/2021 9:16 PM          XR CHEST PA OR AP 1 VIEW   Final Result   FINDINGS/IMPRESSION:      There are minimal degenerative changes of the thoracic spine.  The heart   size is normal.  There is slight tortuosity of the thoracic aorta.      The lungs are clear.      Electronically Signed by: ANDREY RODRIGUEZ MD    Signed on: 7/22/2021 2:48 PM              E D  C O U R S E     ED Course as of Jul 22 2220   u Jul 22, 2021   1858 Son is now at bedside, states that normally his mother is able to ambulate well without assistance.  She did have a stroke 10 years ago which caused her right leg to drag but has significantly improved.  Yesterday he noticed that she was slower and ambulating and today she was unable to get out of bed because her legs were weak.  He states she had something similar 10 years ago when she had a stroke after being evaluated by an ophthalmologist, also had water on the brain with similar symptoms of lower extremity weakness which improved after she had fluid removed.  He does not recall that she had a shunts placed.  He remembered at that time her energy was down appetite was down as well and has noticed something similar.    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    Had MRI in 2018 the redemonstrated the cyst but there was no communication at that time.    [GD]   2209 Discussed with Dr. Galilea Mcduffie, accepts admission.    [GD]   2210 Patient and son were apprised of diagnostic results and need for admission.  Agree to admission.  All questions were answered.       [GD]   2219 Perfect serve message sent to Dr. Rodriguez of neurology, accepts consult    [GD]      ED Course User Index  [GD] Nguyễn Ernst MD       Medications - No data to display     C L I N I C A L  I M P R E S S I O N     ED Diagnosis   1. Weakness of both legs     2. Decreased ambulation status          Disposition        Transfer to Another Facility 7/22/2021 10:20 PM  Telemetry Bed?: No  Admitting Physician: ESTELLA GRIMM [[id number]]  Is this a telephone or verbal order?: This is a telephone order from the admitting physician       Summary of your Discharge Medications      You have not been prescribed any medications.           Nguyễn Ernst MD  07/22/21 7693     Ms. Sepulveda is a 64 year old woman who presented with acute onset burning epigastric pain for 3-4 days found to have underlying acute pancreatitis of unclear etiology, patient is s/p neoadjuvant chemotherapy followed by pancreaticoduodenectomy in 2021 at [facility] for head of pancreas adenocarcinoma reportedly in remission since, she has been adherent to recommended follow up, most recent CT abdomen 3/2024 showed no interval change and no evidence of lesion within the pancreatic body/tail. Images interpreted, including prior reports and updated labs reviewed in detail, my interpretation, CT abdomen on admission revealed evidence of diffuse natalie-pancreatic inflammatory changes suggestive of acute pancreatitis and a new 1.2 cm body of pancreas cyst (distal) without overt evidence of pancreatic mass. She denied any alcohol use. No changes in medications or over the counter supplements. She is not on any suspect medications at home. No elevated triglycerides. S/p cholecystectomy. Given acuity of cyst formation (not present in March 2024) and episode of acute pancreatitis (possibly 1 milder episode 1 month prior) can not rule out pseudocyst versus IPMN versus recurrence. Ultimately would consider dedicated pancreatic MRI versus endoscopic ultrasound evaluation preferably once acute inflammatory response resolved, can be done as an outpatient. No urgent or emergent endoscopic intervention planned at this time. Discussed in detail with patient at bedside. Reviewed with covering provider Dr. Mcknight. In the interim would treat supportively for acute pancreatitis. Can send tumor markers as above. Would continue pancreatic enzyme replacement therapy if patient tolerating PO intake. We will continue to follow along with you.

## 2024-05-18 NOTE — ED ADULT NURSE REASSESSMENT NOTE - NS ED NURSE REASSESS COMMENT FT1
Assumed care of pt at 19:15 as stated in report from THOR Sanchez. Charting as noted. Patient A&O x4, pt denies any pain/discomfort, denies CP/SOB. Updated on the plan of care. Call bell within reach, bed locked in lowest position. IV site flushed w/ NS. No redness, swelling or pain noted to site. No signs of acute distress noted, safety maintained. Pt remains on CM in NSR and  98% RA. Pending admission. Assumed care of pt as stated in report from THOR Sanchez. Charting as noted. Patient A&O x4, pt denies any pain/discomfort, denies CP/SOB. Updated on the plan of care. Call bell within reach, bed locked in lowest position. IV site flushed w/ NS. No redness, swelling or pain noted to site. No signs of acute distress noted, safety maintained. Pt remains on CM in NSR and  98% RA. Pending admission. Assumed care of pt as stated in report from THOR Sanchez. Charting as noted. Patient A&O x4, pt denies any pain/discomfort, denies CP/SOB. Updated on the plan of care. Call bell within reach, bed locked in lowest position. IV site flushed w/ NS. No redness, swelling or pain noted to site. No signs of acute distress noted, safety maintained. Pending admission.

## 2024-05-19 LAB
ALBUMIN SERPL ELPH-MCNC: 3.2 G/DL — LOW (ref 3.3–5.2)
ALP SERPL-CCNC: 108 U/L — SIGNIFICANT CHANGE UP (ref 40–120)
ALT FLD-CCNC: 16 U/L — SIGNIFICANT CHANGE UP
ANION GAP SERPL CALC-SCNC: 10 MMOL/L — SIGNIFICANT CHANGE UP (ref 5–17)
AST SERPL-CCNC: 16 U/L — SIGNIFICANT CHANGE UP
BASOPHILS # BLD AUTO: 0.03 K/UL — SIGNIFICANT CHANGE UP (ref 0–0.2)
BASOPHILS NFR BLD AUTO: 0.4 % — SIGNIFICANT CHANGE UP (ref 0–2)
BILIRUB DIRECT SERPL-MCNC: 0.1 MG/DL — SIGNIFICANT CHANGE UP (ref 0–0.3)
BILIRUB INDIRECT FLD-MCNC: 0.5 MG/DL — SIGNIFICANT CHANGE UP (ref 0.2–1)
BILIRUB SERPL-MCNC: 0.6 MG/DL — SIGNIFICANT CHANGE UP (ref 0.4–2)
BUN SERPL-MCNC: 11.8 MG/DL — SIGNIFICANT CHANGE UP (ref 8–20)
CALCIUM SERPL-MCNC: 8.6 MG/DL — SIGNIFICANT CHANGE UP (ref 8.4–10.5)
CHLORIDE SERPL-SCNC: 99 MMOL/L — SIGNIFICANT CHANGE UP (ref 96–108)
CO2 SERPL-SCNC: 26 MMOL/L — SIGNIFICANT CHANGE UP (ref 22–29)
CREAT SERPL-MCNC: 0.65 MG/DL — SIGNIFICANT CHANGE UP (ref 0.5–1.3)
EGFR: 98 ML/MIN/1.73M2 — SIGNIFICANT CHANGE UP
EOSINOPHIL # BLD AUTO: 0 K/UL — SIGNIFICANT CHANGE UP (ref 0–0.5)
EOSINOPHIL NFR BLD AUTO: 0 % — SIGNIFICANT CHANGE UP (ref 0–6)
GLUCOSE SERPL-MCNC: 81 MG/DL — SIGNIFICANT CHANGE UP (ref 70–99)
HCT VFR BLD CALC: 36.6 % — SIGNIFICANT CHANGE UP (ref 34.5–45)
HGB BLD-MCNC: 11.8 G/DL — SIGNIFICANT CHANGE UP (ref 11.5–15.5)
IMM GRANULOCYTES NFR BLD AUTO: 0.4 % — SIGNIFICANT CHANGE UP (ref 0–0.9)
LYMPHOCYTES # BLD AUTO: 1.21 K/UL — SIGNIFICANT CHANGE UP (ref 1–3.3)
LYMPHOCYTES # BLD AUTO: 15.1 % — SIGNIFICANT CHANGE UP (ref 13–44)
MCHC RBC-ENTMCNC: 28.9 PG — SIGNIFICANT CHANGE UP (ref 27–34)
MCHC RBC-ENTMCNC: 32.2 GM/DL — SIGNIFICANT CHANGE UP (ref 32–36)
MCV RBC AUTO: 89.7 FL — SIGNIFICANT CHANGE UP (ref 80–100)
MONOCYTES # BLD AUTO: 0.77 K/UL — SIGNIFICANT CHANGE UP (ref 0–0.9)
MONOCYTES NFR BLD AUTO: 9.6 % — SIGNIFICANT CHANGE UP (ref 2–14)
NEUTROPHILS # BLD AUTO: 5.98 K/UL — SIGNIFICANT CHANGE UP (ref 1.8–7.4)
NEUTROPHILS NFR BLD AUTO: 74.5 % — SIGNIFICANT CHANGE UP (ref 43–77)
PLATELET # BLD AUTO: 181 K/UL — SIGNIFICANT CHANGE UP (ref 150–400)
POTASSIUM SERPL-MCNC: 4.3 MMOL/L — SIGNIFICANT CHANGE UP (ref 3.5–5.3)
POTASSIUM SERPL-SCNC: 4.3 MMOL/L — SIGNIFICANT CHANGE UP (ref 3.5–5.3)
PROT SERPL-MCNC: 5.9 G/DL — LOW (ref 6.6–8.7)
RBC # BLD: 4.08 M/UL — SIGNIFICANT CHANGE UP (ref 3.8–5.2)
RBC # FLD: 12.4 % — SIGNIFICANT CHANGE UP (ref 10.3–14.5)
SODIUM SERPL-SCNC: 135 MMOL/L — SIGNIFICANT CHANGE UP (ref 135–145)
WBC # BLD: 8.02 K/UL — SIGNIFICANT CHANGE UP (ref 3.8–10.5)
WBC # FLD AUTO: 8.02 K/UL — SIGNIFICANT CHANGE UP (ref 3.8–10.5)

## 2024-05-19 PROCEDURE — 99232 SBSQ HOSP IP/OBS MODERATE 35: CPT

## 2024-05-19 RX ORDER — ONDANSETRON 8 MG/1
4 TABLET, FILM COATED ORAL EVERY 4 HOURS
Refills: 0 | Status: DISCONTINUED | OUTPATIENT
Start: 2024-05-19 | End: 2024-05-20

## 2024-05-19 RX ORDER — PANTOPRAZOLE SODIUM 20 MG/1
40 TABLET, DELAYED RELEASE ORAL
Refills: 0 | Status: DISCONTINUED | OUTPATIENT
Start: 2024-05-19 | End: 2024-05-20

## 2024-05-19 RX ORDER — SUCRALFATE 1 G
1 TABLET ORAL EVERY 6 HOURS
Refills: 0 | Status: DISCONTINUED | OUTPATIENT
Start: 2024-05-19 | End: 2024-05-21

## 2024-05-19 RX ORDER — ACETAMINOPHEN 500 MG
1000 TABLET ORAL ONCE
Refills: 0 | Status: COMPLETED | OUTPATIENT
Start: 2024-05-19 | End: 2024-05-19

## 2024-05-19 RX ORDER — ACETAMINOPHEN 500 MG
1000 TABLET ORAL EVERY 6 HOURS
Refills: 0 | Status: DISCONTINUED | OUTPATIENT
Start: 2024-05-19 | End: 2024-05-21

## 2024-05-19 RX ADMIN — ONDANSETRON 4 MILLIGRAM(S): 8 TABLET, FILM COATED ORAL at 12:41

## 2024-05-19 RX ADMIN — Medication 1000 MILLIGRAM(S): at 14:21

## 2024-05-19 RX ADMIN — Medication 1000 MILLIGRAM(S): at 07:40

## 2024-05-19 RX ADMIN — Medication 400 MILLIGRAM(S): at 13:21

## 2024-05-19 RX ADMIN — Medication 12.5 MILLIGRAM(S): at 05:20

## 2024-05-19 RX ADMIN — Medication 400 MILLIGRAM(S): at 06:22

## 2024-05-19 RX ADMIN — ONDANSETRON 4 MILLIGRAM(S): 8 TABLET, FILM COATED ORAL at 23:31

## 2024-05-19 RX ADMIN — ONDANSETRON 4 MILLIGRAM(S): 8 TABLET, FILM COATED ORAL at 05:20

## 2024-05-19 RX ADMIN — PANTOPRAZOLE SODIUM 40 MILLIGRAM(S): 20 TABLET, DELAYED RELEASE ORAL at 18:03

## 2024-05-19 RX ADMIN — ONDANSETRON 4 MILLIGRAM(S): 8 TABLET, FILM COATED ORAL at 18:02

## 2024-05-19 NOTE — PROGRESS NOTE ADULT - ASSESSMENT
64F with PMHX Pancreatic CA s/p Whipple s/p Chemo ([place]) in remission x3 years, HTN presents to [facility] ER c/o sudden onset mid abdominal pain radiating to her L flank associated with N/V admitted for Acute on Chronic Pancreatitis c/b pancreatic cystic lesion.    Acute on Chronic Pancreatitis  -H/o of pancreatic CA s/p Whipple  -CT ABDPEL IVC +peripancreatic fat stranding +acute pancreatitis +pancreatic cystic lesion  -Worsening now  -NPO and LR infusion @ 150 ml/hr  -IV acetaminophen PRN. Patient refused morphpine  -Restart Pancreaze DR 10,500 BID. Coordinated with pharmacy  -Prior CT from March without mention of pancreatic cyst  -GI consulted -recommended conservative management    HTN  -Metoprolol Tartrate 12.5mg BID     Pancreatic CA  -Whipple procedure at [facility] in [place] 3-1/2 years ago had subsequent chemotherapy remission x3 years    Discussed with Patient    Dfispo- Inpatient due to severe pain 64F with PMHX Pancreatic CA s/p Whipple s/p Chemo ([place]) in remission x3 years, HTN presents to [facility] ER c/o sudden onset mid abdominal pain radiating to her L flank associated with N/V admitted for Acute on Chronic Pancreatitis c/b pancreatic cystic lesion.    Acute on Chronic Pancreatitis  -H/o of pancreatic CA s/p Whipple  -CT ABDPEL IVC +peripancreatic fat stranding +acute pancreatitis +pancreatic cystic lesion  -Worsening now  -NPO and LR infusion @ 150 ml/hr  -IV acetaminophen PRN. Patient refused morphpine  -Restart Pancreaze DR 10,500 BID. Coordinated with pharmacy  -Prior CT from March without mention of pancreatic cyst  -GI consulted -recommended conservative management    HTN  -HOld Metoprolol Tartrate 12.5mg BID     Pancreatic CA  -Whipple procedure at [facility] in [place] 3-1/2 years ago had subsequent chemotherapy remission x3 years    Discussed with Patient    Dfispo- Inpatient due to severe pain

## 2024-05-19 NOTE — PROGRESS NOTE ADULT - NS ATTEND AMEND GEN_ALL_CORE FT
Ms. Sepulveda is a 64 year old woman who presented with acute pancreatitis of unclear etiology, patient is s/p neoadjuvant chemotherapy followed by pancreaticoduodenectomy in 2021 at [facility] for head of pancreas adenocarcinoma reportedly in remission, she has been adherent to recommended follow up, most recent CT abdomen 3/2024 showed no interval change and no evidence of lesion within the pancreatic body/tail. Images interpreted, including prior reports and updated labs reviewed in detail, my interpretation, CT abdomen on admission revealed evidence of diffuse natalie-pancreatic inflammatory changes suggestive of acute pancreatitis and a new 1.2 cm body of pancreas cyst (distal) without obvious mass. She denied any alcohol use. No changes in medications or over the counter supplements. She is not on any suspect medications at home. No elevated triglycerides. S/p cholecystectomy. Given acuity of cyst formation (not present in March 2024) and episode of acute pancreatitis (possibly 1 milder episode 1 month prior) can not rule out pseudocyst versus IPMN versus recurrence for which findings concerning. Ultimately would consider dedicated pancreatic MRI versus endoscopic ultrasound evaluation preferably once acute inflammatory response resolved.     Currently most concerning symptom is severe reflux, suspect likely due change in timing of Metoprolol compared with home regimen, additionally patient requesting to use home PERT given brand preference, discussed with Dr. Viveros who will arrange with pharmacy. Discussed in detail with patient at bedside. Would treat supportively for acute pancreatitis. If GERD symptoms resolve and tolerating PO intake can consider discharge with close outpatient follow up regarding work up for acute onset pancreatitis of unclear etiology and new cystic lesion of pancreas. Follow up pending tumor markers,  especially. Remainder of care per medical team. We will continue to follow along with you.

## 2024-05-19 NOTE — PROGRESS NOTE ADULT - ASSESSMENT
64F with PMHX Pancreatic CA s/p Whipple s/p Chemo (January 2021 - at [facility]) in remission x3 years, HTN presented to ED with sudden onset of mid abdominal pain to her Left flank and associated nausea and dry heaving. GI consulted after radiology finding of  acute pancreatitis and pancreatic cystic lesion.     Acute pancreatitis  Pancreatic cystic lesion      CT abd shows acute pancreatitis and a 1.2 cm cystic lesion in the distal body.    Lipase 442 on arrival, Mild leucocytosis 12. Patient remains afebrile. Patient denies use of alcohol, normal triglycerides, normal blood glucose level. No gall stones. Patient s/p cholecystectomy. Trace intrahepatic dilation and pneumobilia is normal finding post cholecystectomy. Liver enzymes normal.    Plan:  Reported epigastric pain, worsening gas pain  Con t IV fluids, can start clear liquids as tolerated  Monitor volume status, monitor urine output  Cont home dose Pancreatic enzymes replacement therapy. Patient prefers to have the same brand of medication she takes at home  Protonix 40 mg IV BID  Sucralfate 1 gm po every 6 hours   Consider adjusting time of Metoprolol dose with food for increased tolerance; patient repeatedly blaming that caused the above symptoms despite explaining all the other possible reasons including acute pancreatitis   Analgesics as needed, bowel regimen to avoid constipation while receiving opioids  Trend CBC, CMP, CRP, and coags   DVT prophylaxis, encourage ambulation as tolerated  Please check CA 19-9  Will recommend repat MR for further evaluation of 1.2 pancreatica cystic lesion. It can be done as outpatient once acute pancreatitis resolved

## 2024-05-20 LAB
ALBUMIN SERPL ELPH-MCNC: 2.9 G/DL — LOW (ref 3.3–5.2)
ALP SERPL-CCNC: 97 U/L — SIGNIFICANT CHANGE UP (ref 40–120)
ALT FLD-CCNC: 14 U/L — SIGNIFICANT CHANGE UP
ANION GAP SERPL CALC-SCNC: 11 MMOL/L — SIGNIFICANT CHANGE UP (ref 5–17)
AST SERPL-CCNC: 16 U/L — SIGNIFICANT CHANGE UP
BILIRUB SERPL-MCNC: 0.4 MG/DL — SIGNIFICANT CHANGE UP (ref 0.4–2)
BUN SERPL-MCNC: 11.1 MG/DL — SIGNIFICANT CHANGE UP (ref 8–20)
CALCIUM SERPL-MCNC: 8.6 MG/DL — SIGNIFICANT CHANGE UP (ref 8.4–10.5)
CANCER AG19-9 SERPL-ACNC: 14 U/ML — SIGNIFICANT CHANGE UP
CHLORIDE SERPL-SCNC: 103 MMOL/L — SIGNIFICANT CHANGE UP (ref 96–108)
CO2 SERPL-SCNC: 27 MMOL/L — SIGNIFICANT CHANGE UP (ref 22–29)
CREAT SERPL-MCNC: 0.77 MG/DL — SIGNIFICANT CHANGE UP (ref 0.5–1.3)
CULTURE RESULTS: ABNORMAL
EGFR: 86 ML/MIN/1.73M2 — SIGNIFICANT CHANGE UP
GLUCOSE SERPL-MCNC: 77 MG/DL — SIGNIFICANT CHANGE UP (ref 70–99)
HCT VFR BLD CALC: 34.1 % — LOW (ref 34.5–45)
HGB BLD-MCNC: 11 G/DL — LOW (ref 11.5–15.5)
IGG FLD-MCNC: 1060 MG/DL — SIGNIFICANT CHANGE UP (ref 610–1660)
IGG1 SER-MCNC: 493 MG/DL — SIGNIFICANT CHANGE UP (ref 240–1118)
IGG2 SER-MCNC: 450 MG/DL — SIGNIFICANT CHANGE UP (ref 124–549)
IGG3 SER-MCNC: 52.6 MG/DL — SIGNIFICANT CHANGE UP (ref 15–102)
IGG4 SER-MCNC: 87.1 MG/DL — SIGNIFICANT CHANGE UP (ref 1–123)
LIDOCAIN IGE QN: 12 U/L — LOW (ref 22–51)
MAGNESIUM SERPL-MCNC: 1.6 MG/DL — SIGNIFICANT CHANGE UP (ref 1.6–2.6)
MCHC RBC-ENTMCNC: 28.9 PG — SIGNIFICANT CHANGE UP (ref 27–34)
MCHC RBC-ENTMCNC: 32.3 GM/DL — SIGNIFICANT CHANGE UP (ref 32–36)
MCV RBC AUTO: 89.7 FL — SIGNIFICANT CHANGE UP (ref 80–100)
PLATELET # BLD AUTO: 161 K/UL — SIGNIFICANT CHANGE UP (ref 150–400)
POTASSIUM SERPL-MCNC: 4.3 MMOL/L — SIGNIFICANT CHANGE UP (ref 3.5–5.3)
POTASSIUM SERPL-SCNC: 4.3 MMOL/L — SIGNIFICANT CHANGE UP (ref 3.5–5.3)
PROT SERPL-MCNC: 5.7 G/DL — LOW (ref 6.6–8.7)
RBC # BLD: 3.8 M/UL — SIGNIFICANT CHANGE UP (ref 3.8–5.2)
RBC # FLD: 12.2 % — SIGNIFICANT CHANGE UP (ref 10.3–14.5)
SODIUM SERPL-SCNC: 141 MMOL/L — SIGNIFICANT CHANGE UP (ref 135–145)
SPECIMEN SOURCE: SIGNIFICANT CHANGE UP
WBC # BLD: 5.83 K/UL — SIGNIFICANT CHANGE UP (ref 3.8–10.5)
WBC # FLD AUTO: 5.83 K/UL — SIGNIFICANT CHANGE UP (ref 3.8–10.5)

## 2024-05-20 PROCEDURE — 84132 ASSAY OF SERUM POTASSIUM: CPT

## 2024-05-20 PROCEDURE — 82784 ASSAY IGA/IGD/IGG/IGM EACH: CPT

## 2024-05-20 PROCEDURE — 96375 TX/PRO/DX INJ NEW DRUG ADDON: CPT

## 2024-05-20 PROCEDURE — 82435 ASSAY OF BLOOD CHLORIDE: CPT

## 2024-05-20 PROCEDURE — 80076 HEPATIC FUNCTION PANEL: CPT

## 2024-05-20 PROCEDURE — 81001 URINALYSIS AUTO W/SCOPE: CPT

## 2024-05-20 PROCEDURE — 99233 SBSQ HOSP IP/OBS HIGH 50: CPT

## 2024-05-20 PROCEDURE — 84295 ASSAY OF SERUM SODIUM: CPT

## 2024-05-20 PROCEDURE — 96374 THER/PROPH/DIAG INJ IV PUSH: CPT

## 2024-05-20 PROCEDURE — 82787 IGG 1 2 3 OR 4 EACH: CPT

## 2024-05-20 PROCEDURE — 83690 ASSAY OF LIPASE: CPT

## 2024-05-20 PROCEDURE — 87086 URINE CULTURE/COLONY COUNT: CPT

## 2024-05-20 PROCEDURE — 84478 ASSAY OF TRIGLYCERIDES: CPT

## 2024-05-20 PROCEDURE — 83605 ASSAY OF LACTIC ACID: CPT

## 2024-05-20 PROCEDURE — 99231 SBSQ HOSP IP/OBS SF/LOW 25: CPT

## 2024-05-20 PROCEDURE — 99285 EMERGENCY DEPT VISIT HI MDM: CPT | Mod: 25

## 2024-05-20 PROCEDURE — 84100 ASSAY OF PHOSPHORUS: CPT

## 2024-05-20 PROCEDURE — 96376 TX/PRO/DX INJ SAME DRUG ADON: CPT

## 2024-05-20 PROCEDURE — 80061 LIPID PANEL: CPT

## 2024-05-20 PROCEDURE — 80053 COMPREHEN METABOLIC PANEL: CPT

## 2024-05-20 PROCEDURE — 36415 COLL VENOUS BLD VENIPUNCTURE: CPT

## 2024-05-20 PROCEDURE — 74177 CT ABD & PELVIS W/CONTRAST: CPT | Mod: MC

## 2024-05-20 PROCEDURE — 85014 HEMATOCRIT: CPT

## 2024-05-20 PROCEDURE — 82947 ASSAY GLUCOSE BLOOD QUANT: CPT

## 2024-05-20 PROCEDURE — 85018 HEMOGLOBIN: CPT

## 2024-05-20 PROCEDURE — 93005 ELECTROCARDIOGRAM TRACING: CPT

## 2024-05-20 PROCEDURE — 80048 BASIC METABOLIC PNL TOTAL CA: CPT

## 2024-05-20 PROCEDURE — 85027 COMPLETE CBC AUTOMATED: CPT

## 2024-05-20 PROCEDURE — 82803 BLOOD GASES ANY COMBINATION: CPT

## 2024-05-20 PROCEDURE — 86301 IMMUNOASSAY TUMOR CA 19-9: CPT

## 2024-05-20 PROCEDURE — 82330 ASSAY OF CALCIUM: CPT

## 2024-05-20 PROCEDURE — 85025 COMPLETE CBC W/AUTO DIFF WBC: CPT

## 2024-05-20 PROCEDURE — 83735 ASSAY OF MAGNESIUM: CPT

## 2024-05-20 RX ORDER — ONDANSETRON 8 MG/1
4 TABLET, FILM COATED ORAL EVERY 6 HOURS
Refills: 0 | Status: DISCONTINUED | OUTPATIENT
Start: 2024-05-20 | End: 2024-05-21

## 2024-05-20 RX ORDER — PANTOPRAZOLE SODIUM 20 MG/1
40 TABLET, DELAYED RELEASE ORAL
Refills: 0 | Status: DISCONTINUED | OUTPATIENT
Start: 2024-05-20 | End: 2024-05-21

## 2024-05-20 RX ORDER — OXYCODONE HYDROCHLORIDE 5 MG/1
5 TABLET ORAL EVERY 6 HOURS
Refills: 0 | Status: DISCONTINUED | OUTPATIENT
Start: 2024-05-20 | End: 2024-05-21

## 2024-05-20 RX ADMIN — ONDANSETRON 4 MILLIGRAM(S): 8 TABLET, FILM COATED ORAL at 09:46

## 2024-05-20 RX ADMIN — ONDANSETRON 4 MILLIGRAM(S): 8 TABLET, FILM COATED ORAL at 04:36

## 2024-05-20 NOTE — CONSULT NOTE ADULT - ASSESSMENT
64y old  Female who follows Dr Rico at [facility] or a history of pancreatic cancer S/P ERCP with metal stent placement ([id number]) S/P neoadjuvent chemo S/P whipple resection 1/5/21 5/18/21 Completed 2 months of adjuvany [place]/Abraxane    presents to [facility] ER c/o sudden onset mid abdominal pain radiating to her L flank associated with N/V admitted for Acute on Chronic Pancreatitis c/b pancreatic cystic lesion.    CT CT Abdomen and Pelvis -Status post Whipple procedure. Peripancreatic fat stranding, compatible   with acute pancreatitis.1.2 cm pancreatic cystic lesion which can be further evaluated on MRI.  Indeterminate 6 mm right lower lobe pulmonary nodule. Recommend correlation with prior imaging if available for comparison.    Lipase >400 on admission Now 12  GI following - Recommend repat MR for further evaluation of 1.2 pancreatica cystic lesion. It can be done as outpatient once acute pancreatitis resolved     Cont home dose Pancreatic enzymes replacement therapy.     History of Pancreatic Cancer   S/P ERCP with metal stent placement (52267)   S/P neoadjuvent chemo S/P whipple resection 1/5/21 5/18/21   Completed 2 months of adjuvant [place]/Abraxane      Will follow and update [facility]

## 2024-05-20 NOTE — PROGRESS NOTE ADULT - ASSESSMENT
64F with PMHX Pancreatic CA s/p Whipple s/p Chemo ([place]) in remission x3 years, HTN presents to [facility] ER c/o sudden onset mid abdominal pain radiating to her L flank associated with N/V admitted for Acute on Chronic Pancreatitis c/b pancreatic cystic lesion.    Acute on Chronic Pancreatitis  -H/o of pancreatic CA s/p Whipple  -CT ABDPEL IVC +peripancreatic fat stranding +acute pancreatitis +pancreatic cystic lesion  -Improving  -Clear liquid diet and advance as tolerated  -IV acetaminophen PRN. Patient refused morphine Started oral oxycodon  -Oral zofran for nausea  -Restart Pancreaze DR 10,500 BID. Coordinated with pharmacy  -Prior CT from March without mention of pancreatic cyst  -GI consulted -recommended conservative management    HTN  -HOld Metoprolol Tartrate 12.5mg BID     Pancreatic CA  -Whipple procedure at [facility] in [place] 3-1/2 years ago had subsequent chemotherapy remission x3 years    Discussed with Patient    Dfispo- Inpatient advance diet

## 2024-05-20 NOTE — CONSULT NOTE ADULT - SUBJECTIVE AND OBJECTIVE BOX
64y old  Female who follows Dr Rico for a history of pancreatic cancer S/P ERCP with metal stent placement ([id number]) S/P neoadjuvent chemo S/P whipple resection 1/5/21 5/18/21 Completed 2 months of adjuvany [place]/Abraxane Presents with abd pain Admitted with Pancreatitis     Home Medications:  metoprolol tartrate 25 mg oral tablet: 0.5 tab(s) orally 2 times a day (18 May 2024 00:21)  Pancreaze 10,500 units-61,500 units-35,500 units oral delayed release capsule: 1 cap(s) orally 2 times a day (18 May 2024 00:22)    MEDICATIONS  (STANDING):  naloxone Injectable 0.4 milliGRAM(s) IV Push once  Pancreaze 1 Capsule(s),Pancreaze 10,500 units-61,500 units-35,500 units DR capsule 1 Capsule(s) 1 Capsule(s) Oral two times a day with meals  pantoprazole    Tablet 40 milliGRAM(s) Oral before breakfast  polyethylene glycol 3350 17 Gram(s) Oral daily  senna 2 Tablet(s) Oral at bedtime  sucralfate suspension 1 Gram(s) Oral every 6 hours    MEDICATIONS  (PRN):  acetaminophen     Tablet .. 650 milliGRAM(s) Oral every 6 hours PRN Temp greater or equal to 38C (100.4F), Mild Pain (1 - 3)  acetaminophen   IVPB .. 1000 milliGRAM(s) IV Intermittent every 6 hours PRN Severe Pain (7 - 10)  aluminum hydroxide/magnesium hydroxide/simethicone Suspension 30 milliLiter(s) Oral every 4 hours PRN Dyspepsia  bisacodyl 5 milliGRAM(s) Oral daily PRN Constipation  melatonin 3 milliGRAM(s) Oral at bedtime PRN Insomnia  ondansetron    Tablet 4 milliGRAM(s) Oral every 6 hours PRN Nausea  oxyCODONE    IR 5 milliGRAM(s) Oral every 6 hours PRN Severe Pain (7 - 10)    PE  A&O  color good   breathing unlabored  S1S2    Vital Signs Last 24 Hrs  T(C): 36.6 (20 May 2024 10:12), Max: 37 (19 May 2024 15:40)  T(F): 97.9 (20 May 2024 10:12), Max: 98.6 (19 May 2024 15:40)  HR: 62 (20 May 2024 10:12) (62 - 78)  BP: 131/81 (20 May 2024 10:12) (113/72 - 131/81)  BP(mean): --  RR: 14 (20 May 2024 10:12) (14 - 18)  SpO2: 94% (20 May 2024 10:12) (94% - 96%    Parameters below as of 20 May 2024 10:12  Patient On (Oxygen Delivery Method): room air   total assistance

## 2024-05-21 ENCOUNTER — TRANSCRIPTION ENCOUNTER (OUTPATIENT)
Age: 64
End: 2024-05-21

## 2024-05-21 VITALS
HEART RATE: 75 BPM | DIASTOLIC BLOOD PRESSURE: 70 MMHG | RESPIRATION RATE: 19 BRPM | SYSTOLIC BLOOD PRESSURE: 144 MMHG | OXYGEN SATURATION: 95 % | TEMPERATURE: 98 F

## 2024-05-21 PROCEDURE — 99239 HOSP IP/OBS DSCHRG MGMT >30: CPT

## 2024-05-21 RX ORDER — ONDANSETRON 8 MG/1
1 TABLET, FILM COATED ORAL
Qty: 60 | Refills: 0
Start: 2024-05-21 | End: 2024-06-19

## 2024-05-21 RX ORDER — PANTOPRAZOLE SODIUM 20 MG/1
1 TABLET, DELAYED RELEASE ORAL
Qty: 30 | Refills: 0
Start: 2024-05-21 | End: 2024-06-19

## 2024-05-21 NOTE — DISCHARGE NOTE NURSING/CASE MANAGEMENT/SOCIAL WORK - PATIENT PORTAL LINK FT
You can access the FollowMyHealth Patient Portal offered by [facility] by registering at the following website: [email or web address]. By joining [facility]’s FollowMyHealth portal, you will also be able to view your health information using other applications (apps) compatible with our system.

## 2024-05-21 NOTE — PROGRESS NOTE ADULT - ASSESSMENT
64y old  Female who follows Dr Rico at [facility] or a history of pancreatic cancer S/P ERCP with metal stent placement ([id number]) S/P neoadjuvent chemo S/P whipple resection 1/5/21 5/18/21 Completed 2 months of adjuvany [place]/Abraxane    presents to [facility] ER c/o sudden onset mid abdominal pain radiating to her L flank associated with N/V admitted for Acute on Chronic Pancreatitis c/b pancreatic cystic lesion.    CT CT Abdomen and Pelvis -Status post Whipple procedure. Peripancreatic fat stranding, compatible   with acute pancreatitis.1.2 cm pancreatic cystic lesion which can be further evaluated on MRI.  Indeterminate 6 mm right lower lobe pulmonary nodule. Recommend correlation with prior imaging if available for comparison.    Lipase >400 on admission Now 12  GI signed off- Improving acute idiopathic pancreatitis - Recommend repeat MRI for further evaluation of 1.2 pancreatica cystic lesion. It can be done as outpatient once acute pancreatitis resolved     Cont home dose Pancreatic enzymes replacement therapy.     History of Pancreatic Cancer   S/P ERCP with metal stent placement ([id number])   S/P neoadjuvent chemo S/P whipple resection 1/5/21 5/18/21   Completed 2 months of adjuvant [place]/Abraxane      Will follow and update [facility]

## 2024-05-21 NOTE — DISCHARGE NOTE PROVIDER - ATTENDING DISCHARGE PHYSICAL EXAMINATION:
Vital Signs Last 24 Hrs  T(C): 36.7 (21 May 2024 09:47), Max: 36.7 (20 May 2024 16:46)  T(F): 98.1 (21 May 2024 09:47), Max: 98.1 (20 May 2024 16:46)  HR: 76 (21 May 2024 09:47) (67 - 80)  BP: 123/74 (21 May 2024 09:47) (120/74 - 149/76)  BP(mean): --  RR: 17 (21 May 2024 09:47) (17 - 18)  SpO2: 96% (21 May 2024 09:47) (95% - 98%)    Parameters below as of 21 May 2024 09:47  Patient On (Oxygen Delivery Method): room air    CONSTITUTIONAL: NAD, Not in acute distress  EYES:  EOMI, conjunctiva and sclera clear  ENMT: , neck supple , non-tender  RESPIRATORY: Normal respiratory effort; lungs are clear to auscultation bilaterally  CARDIOVASCULAR: S1S2 present  ABDOMEN: soft. bowel sound present. No tenderness  MUSCLOSKELETAL: ; no clubbing or cyanosis of digits;   PSYCH: A+O to person, place, and time; affect appropriate  NEUROLOGY: CN, motor and sensory intact   SKIN: No rashes; no palpable lesions  Extremity: No bilateral edema

## 2024-05-21 NOTE — DISCHARGE NOTE PROVIDER - HOSPITAL COURSE
64F with PMHX Pancreatic CA s/p Whipple s/p Chemo ([place]) in remission x3 years, HTN presents to [facility] ER c/o sudden onset mid abdominal pain radiating to her L flank associated with N/V admitted for Acute on Chronic Pancreatitis c/b pancreatic cystic lesion.   64F with PMHX Pancreatic CA s/p Whipple s/p Chemo ([place]) in remission x3 years, HTN presents to [facility] ER c/o sudden onset mid abdominal pain radiating to her L flank associated with N/V admitted for Acute on Chronic Pancreatitis c/b pancreatic cystic lesion.    #Acute on Chronic Pancreatitis  -H/o of pancreatic CA s/p Whipple  -CT ABDPEL IVC +peripancreatic fat stranding +acute pancreatitis +pancreatic cystic lesion  -Improved after bowel rest and IV fluid  -Clear liquid diet and advance to soft diet. Tolerated well  -Discharged on oral zofran  -FOllow up with [facility]   -Resume home  Pancreaze DR 10,500 BID. Coordinated with pharmacy    # Pancreatic cyst  -New from last scan  -GI recommended follow up with [facility] oncology  -Follow up with [facility]    # Bacteruria  -Urine culture grew streptococcus  -Patient denied any dysuria or fever during or before admission  -Discussed risk and benifit with patient. given multiple allergy, decided to hold antibiotics.    HTN  -Continue Metoprolol Tartrate 12.5mg BID     Pancreatic CA  -Whipple procedure at [facility] in [place] 3-1/2 years ago had subsequent chemotherapy remission x3 years  -Follow up for MRI abdomen

## 2024-05-21 NOTE — DIETITIAN NUTRITION RISK NOTIFICATION - ADDITIONAL COMMENTS/DIETITIAN RECOMMENDATIONS
Liberalize diet to regular- pt aware of low fat restrictions  Continue Pancreaze. Rx: MVI daily. Continue bowel regimen PRN. Encourage po intake, monitor diet tolerance, and provide assistance at meals as needed. Obtain daily weights to monitor trends.

## 2024-05-21 NOTE — DISCHARGE NOTE PROVIDER - CARE PROVIDER_API CALL
Aramis Brady  Gastroenterology  [address]  [place] [address]  Phone: [phone]  Fax: [phone]  Follow Up Time:

## 2024-05-21 NOTE — DISCHARGE NOTE PROVIDER - NSDCMRMEDTOKEN_GEN_ALL_CORE_FT
metoprolol tartrate 25 mg oral tablet: 0.5 tab(s) orally 2 times a day  Pancreaze 10,500 units-61,500 units-35,500 units oral delayed release capsule: 1 cap(s) orally 2 times a day   metoprolol tartrate 25 mg oral tablet: 0.5 tab(s) orally 2 times a day  ondansetron 4 mg oral tablet: 1 tab(s) orally every 6 hours as needed for Nausea  Pancreaze 10,500 units-61,500 units-35,500 units oral delayed release capsule: 1 cap(s) orally 2 times a day  pantoprazole 40 mg oral delayed release tablet: 1 tab(s) orally once a day (before a meal)

## 2024-05-21 NOTE — PROGRESS NOTE ADULT - SUBJECTIVE AND OBJECTIVE BOX
Brittany reports feeling much better today.  She has not had pain medicine since yesterday.  She is tolerating liquids without any pain.  Lipase has normalized completely.  I will begin her on a low-fat diet.  If she tolerates this she would be clear for discharge from a GI viewpoint.  We had a long discussion of the follow-up studies that she needs.  We have sent off a CA 19–9 and this should be shared with [facility] when it returns.  Additionally she will need an MRI with and without contrast but it would be best done at [facility].  That way they can make the decision on whether the new cystic lesion in the body of the pancreas needs biopsy.  GI will sign off today.  Please call us for any further inpatient questions.
Chief Complaint: This is a 64y old woman patient being seen in follow-up consultation for acute pancreatitis, radiology finding of pancreatic cystic lesion.    Interval HPI / 24H events:  Patient seen and examined this morning, reports epigastric pain and nausea, 1 episode of NBNB emesis last night. Symptoms  worse after taking her scheduled dose of Metoprolol. Denies fever, chills.     Review of Systems:  . Constitutional: No fever, chills  . HEENT: Negative  · Respiratory and Thorax: No shortness of breath, no cough  · Cardiovascular: No chest pain, palpitation, no dizziness  · Gastrointestinal: see above  · Genitourinary: No hematuria  · Musculoskeletal: Negative  · Neurological: negative  · Psychiatric: no agitation, no anxiety      PAST MEDICAL/SURGICAL HISTORY:    MEDICATIONS  (STANDING):  lactated ringers. 1000 milliLiter(s) (150 mL/Hr) IV Continuous <Continuous>  naloxone Injectable 0.4 milliGRAM(s) IV Push once  pantoprazole  Injectable 40 milliGRAM(s) IV Push two times a day  polyethylene glycol 3350 17 Gram(s) Oral daily  senna 2 Tablet(s) Oral at bedtime    MEDICATIONS  (PRN):  acetaminophen     Tablet .. 650 milliGRAM(s) Oral every 6 hours PRN Temp greater or equal to 38C (100.4F), Mild Pain (1 - 3)  acetaminophen   IVPB .. 1000 milliGRAM(s) IV Intermittent every 6 hours PRN Severe Pain (7 - 10)  aluminum hydroxide/magnesium hydroxide/simethicone Suspension 30 milliLiter(s) Oral every 4 hours PRN Dyspepsia  bisacodyl 5 milliGRAM(s) Oral daily PRN Constipation  melatonin 3 milliGRAM(s) Oral at bedtime PRN Insomnia  morphine  - Injectable 2 milliGRAM(s) IV Push every 4 hours PRN Moderate Pain (4 - 6)  morphine  - Injectable 4 milliGRAM(s) IV Push every 4 hours PRN Severe Pain (7 - 10)  ondansetron Injectable 4 milliGRAM(s) IV Push every 4 hours PRN Nausea and/or Vomiting    Compazine (Other)  Aleve (Hives)  penicillin (Swelling)    T(C): 36.5 (05-19-24 @ 11:01), Max: 37 (05-18-24 @ 19:55)  HR: 71 (05-19-24 @ 11:01) (71 - 87)  BP: 131/76 (05-19-24 @ 11:01) (106/65 - 131/76)  RR: 16 (05-19-24 @ 11:01) (16 - 18)  SpO2: 95% (05-19-24 @ 11:01) (94% - 97%)    I&O's Summary    18 May 2024 07:01  -  19 May 2024 07:00  --------------------------------------------------------  IN: 450 mL / OUT: 0 mL / NET: 450 mL      PHYSICAL EXAM:    Constitutional: No acute distress  Neuro: Awake alert, oriented  HEENT: anicteric sclerae  CV: regular rate, regular rhythm  Pulm/chest: lung sounds diminished bilaterally, no accessory muscle use noted  Abd: soft, epigastric tenderness, nondistended +bowel sounds. No rigidity, rebound tenderness, or guarding  Ext: no edema  Skin: warm, no jaundice   Psych: calm, cooperative      LABS:               11.8   8.02  )-----------( 181      ( 05-19 @ 05:40 )             36.6                12.7   10.19 )-----------( 190      ( 05-18 @ 04:08 )             40.0                14.7   11.87 )-----------( 216      ( 05-17 @ 17:34 )             43.4       05-19    135  |  99  |  11.8  ----------------------------<  81  4.3   |  26.0  |  0.65    Ca    8.6      19 May 2024 05:40  Phos  3.6     05-18  Mg     1.7     05-18    TPro  5.9<L>  /  Alb  3.2<L>  /  TBili  0.6  /  DBili  0.1  /  AST  16  /  ALT  16  /  AlkPhos  108  05-19    LIVER FUNCTIONS - ( 19 May 2024 05:40 )  Alb: 3.2 g/dL / Pro: 5.9 g/dL / ALK PHOS: 108 U/L / ALT: 16 U/L / AST: 16 U/L / GGT: x             Lipase: 260 U/L (05-18-24 @ 04:08)  Lipase: 442 U/L (05-17-24 @ 17:34)      < from: CT Abdomen and Pelvis w/ IV Cont (05.17.24 @ 19:58) >    FINDINGS:  LOWER CHEST: 6 mm nodule in the right lower lobe (3, 17).    LIVER: Within normal limits.  BILE DUCTS: Pneumobilia. Trace intrahepatic biliary duct dilatation.  GALLBLADDER: Cholecystectomy.  SPLEEN: Within normal limits.  PANCREAS: Status post Whipple procedure. Atrophic pancreatic remnant with   mild prominence of the pancreatic duct. 1.2 cm cystic lesion in the   distal body. Peripancreatic fat stranding, compatible with acute   pancreatitis.  ADRENALS: Within normal limits.  KIDNEYS/URETERS: Within normal limits.    BLADDER: Within normal limits.  REPRODUCTIVE ORGANS: Uterus and adnexa within normal limits.    BOWEL: No bowel obstruction. Appendix is normal. Gastrojejunostomy.  PERITONEUM: No ascites.  VESSELS: Atherosclerotic changes.  RETROPERITONEUM/LYMPH NODES: No lymphadenopathy.  ABDOMINAL WALL: Fat-containing supraumbilical hernia.  BONES: Degenerative changes.    IMPRESSION:  Status post Whipple procedure. Peripancreatic fat stranding, compatible   with acute pancreatitis.    1.2 cm pancreatic cystic lesion which can be further evaluated on MRI.    Indeterminate 6 mm right lower lobe pulmonary nodule. Recommend   correlation with prior imaging if available for comparison.    < end of copied text >  
[facility] Division of Hospital Medicine  Braden Viveros MD   I'm reachable on [employer]      Patient is a 64y old  Female who presents with a chief complaint of Pancreatitis (20 May 2024 15:09)      SUBJECTIVE / OVERNIGHT EVENTS:   Patient was seen and examined during rounds  - Reports improvement in abdominal pain, nausea and vomiting  -Advanced diet to full liquid and transitioned to oral medication      12 points ROS negative except above    MEDICATIONS  (STANDING):  naloxone Injectable 0.4 milliGRAM(s) IV Push once  Pancreaze 1 Capsule(s),Pancreaze 10,500 units-61,500 units-35,500 units DR capsule 1 Capsule(s) 1 Capsule(s) Oral two times a day with meals  pantoprazole    Tablet 40 milliGRAM(s) Oral before breakfast  polyethylene glycol 3350 17 Gram(s) Oral daily  senna 2 Tablet(s) Oral at bedtime  sucralfate suspension 1 Gram(s) Oral every 6 hours    MEDICATIONS  (PRN):  acetaminophen     Tablet .. 650 milliGRAM(s) Oral every 6 hours PRN Temp greater or equal to 38C (100.4F), Mild Pain (1 - 3)  acetaminophen   IVPB .. 1000 milliGRAM(s) IV Intermittent every 6 hours PRN Severe Pain (7 - 10)  aluminum hydroxide/magnesium hydroxide/simethicone Suspension 30 milliLiter(s) Oral every 4 hours PRN Dyspepsia  bisacodyl 5 milliGRAM(s) Oral daily PRN Constipation  melatonin 3 milliGRAM(s) Oral at bedtime PRN Insomnia  ondansetron    Tablet 4 milliGRAM(s) Oral every 6 hours PRN Nausea  oxyCODONE    IR 5 milliGRAM(s) Oral every 6 hours PRN Severe Pain (7 - 10)        I&O's Summary    19 May 2024 07:01  -  20 May 2024 07:00  --------------------------------------------------------  IN: 3750 mL / OUT: 1020 mL / NET: 2730 mL        PHYSICAL EXAM:  Vital Signs Last 24 Hrs  T(C): 36.7 (20 May 2024 16:46), Max: 37 (19 May 2024 19:25)  T(F): 98.1 (20 May 2024 16:46), Max: 98.6 (19 May 2024 19:25)  HR: 67 (20 May 2024 16:46) (62 - 78)  BP: 126/72 (20 May 2024 16:46) (113/72 - 131/81)  BP(mean): --  RR: 18 (20 May 2024 16:46) (14 - 18)  SpO2: 95% (20 May 2024 16:46) (94% - 96%)    Parameters below as of 20 May 2024 16:46  Patient On (Oxygen Delivery Method): room air        CONSTITUTIONAL: NAD, Not in acute distress  EYES:  EOMI, conjunctiva and sclera clear  ENMT: , neck supple , non-tender  RESPIRATORY: Normal respiratory effort; lungs are clear to auscultation bilaterally  CARDIOVASCULAR: S1S2 present  ABDOMEN: soft. bowel sound present. mild tenderness  MUSCLOSKELETAL: ; no clubbing or cyanosis of digits;   PSYCH: A+O to person, place, and time; affect appropriate  NEUROLOGY: CN, motor and sensory intact   SKIN: No rashes; no palpable lesions  Extremity: No bilateral edema      LABS:                        11.0   5.83  )-----------( 161      ( 20 May 2024 05:39 )             34.1     05-20    141  |  103  |  11.1  ----------------------------<  77  4.3   |  27.0  |  0.77    Ca    8.6      20 May 2024 05:39  Mg     1.6     05-20    TPro  5.7<L>  /  Alb  2.9<L>  /  TBili  0.4  /  DBili  x   /  AST  16  /  ALT  14  /  AlkPhos  97  05-20          Urinalysis Basic - ( 20 May 2024 05:39 )    Color: x / Appearance: x / SG: x / pH: x  Gluc: 77 mg/dL / Ketone: x  / Bili: x / Urobili: x   Blood: x / Protein: x / Nitrite: x   Leuk Esterase: x / RBC: x / WBC x   Sq Epi: x / Non Sq Epi: x / Bacteria: x        Culture - Urine (collected 17 May 2024 22:55)  Source: Clean Catch Clean Catch (Midstream)  Preliminary Report (19 May 2024 23:17):    10,000 - 49,000 CFU/mL Gram positive organisms      CAPILLARY BLOOD GLUCOSE          Labs reviewed:    RADIOLOGY & ADDITIONAL TESTS:  Imaging Personally Reviewed:  Electrocardiogram Personally Reviewed:  
Hospitalist Daily Progress Note    Chief Complaint:  Patient is a 64y old  Female who presents with a chief complaint of Pancreatitis (18 May 2024 07:20)      SUBJECTIVE / OVERNIGHT EVENTS:  Patient was seen and examined at bedside. Complains of abdominal discomfort  Had an episode of vomiting this afternoon  Patient denies chest pain, SOB, fever, chills, dysuria or any other complaints. All remainder ROS negative.     MEDICATIONS  (STANDING):  lactated ringers. 1000 milliLiter(s) (120 mL/Hr) IV Continuous <Continuous>  metoprolol tartrate 12.5 milliGRAM(s) Oral two times a day  naloxone Injectable 0.4 milliGRAM(s) IV Push once  polyethylene glycol 3350 17 Gram(s) Oral daily  senna 2 Tablet(s) Oral at bedtime    MEDICATIONS  (PRN):  acetaminophen     Tablet .. 650 milliGRAM(s) Oral every 6 hours PRN Temp greater or equal to 38C (100.4F), Mild Pain (1 - 3)  aluminum hydroxide/magnesium hydroxide/simethicone Suspension 30 milliLiter(s) Oral every 4 hours PRN Dyspepsia  bisacodyl 5 milliGRAM(s) Oral daily PRN Constipation  melatonin 3 milliGRAM(s) Oral at bedtime PRN Insomnia  morphine  - Injectable 2 milliGRAM(s) IV Push every 4 hours PRN Moderate Pain (4 - 6)  morphine  - Injectable 4 milliGRAM(s) IV Push every 4 hours PRN Severe Pain (7 - 10)  ondansetron Injectable 4 milliGRAM(s) IV Push every 8 hours PRN Nausea and/or Vomiting        I&O's Summary      PHYSICAL EXAM:  Vital Signs Last 24 Hrs  T(C): 36.7 (18 May 2024 11:46), Max: 37 (17 May 2024 23:36)  T(F): 98.1 (18 May 2024 11:46), Max: 98.6 (17 May 2024 23:36)  HR: 77 (18 May 2024 11:46) (69 - 100)  BP: 105/68 (18 May 2024 11:46) (105/64 - 178/87)  BP(mean): 101 (17 May 2024 20:53) (101 - 101)  RR: 18 (18 May 2024 11:46) (16 - 18)  SpO2: 98% (18 May 2024 11:46) (96% - 99%)    Parameters below as of 18 May 2024 11:46  Patient On (Oxygen Delivery Method): room air          Constitutional: NAD, Resting  ENT: Supple, No JVD  Lungs: CTA B/L, Non-labored breathing  Cardio: RRR, S1/S2, No murmur  Abdomen: Soft, minimal tenderness in abdomen, nd  Extremities: No calf tenderness, No pitting edema  Musculoskeletal:   No joint swelling  Psych: Calm, cooperative affect appropriate  Neuro: Awake and alert, oriented x 4  Skin: No rashes; no palpable lesions    LABS:                        12.7   10.19 )-----------( 190      ( 18 May 2024 04:08 )             40.0     05-18    138  |  98  |  10.7  ----------------------------<  104<H>  3.6   |  28.0  |  0.64    Ca    8.8      18 May 2024 04:08  Phos  3.6     05-18  Mg     1.7     05-18    TPro  6.7  /  Alb  3.6  /  TBili  0.6  /  DBili  x   /  AST  21  /  ALT  24  /  AlkPhos  128<H>  05-18          Urinalysis Basic - ( 18 May 2024 04:08 )    Color: x / Appearance: x / SG: x / pH: x  Gluc: 104 mg/dL / Ketone: x  / Bili: x / Urobili: x   Blood: x / Protein: x / Nitrite: x   Leuk Esterase: x / RBC: x / WBC x   Sq Epi: x / Non Sq Epi: x / Bacteria: x        CAPILLARY BLOOD GLUCOSE            RADIOLOGY REVIEWED  
[facility] Division of Hospital Medicine  Braden Viveros MD   I'm reachable on [employer]      Patient is a 64y old  Female who presents with a chief complaint of Pancreatitis (18 May 2024 13:55)      SUBJECTIVE / OVERNIGHT EVENTS:   Patient was seen and examined during rounds  - Reports severe abdominal pain and burning which worsened after morning metoprolol. She also reports nausea and unable to tolerate PO  - She did not tolerate IV morphin. Started on IV tylenol PRN      12 points ROS negative except above    MEDICATIONS  (STANDING):  lactated ringers. 1000 milliLiter(s) (150 mL/Hr) IV Continuous <Continuous>  naloxone Injectable 0.4 milliGRAM(s) IV Push once  pantoprazole  Injectable 40 milliGRAM(s) IV Push two times a day  polyethylene glycol 3350 17 Gram(s) Oral daily  senna 2 Tablet(s) Oral at bedtime    MEDICATIONS  (PRN):  acetaminophen     Tablet .. 650 milliGRAM(s) Oral every 6 hours PRN Temp greater or equal to 38C (100.4F), Mild Pain (1 - 3)  acetaminophen   IVPB .. 1000 milliGRAM(s) IV Intermittent every 6 hours PRN Severe Pain (7 - 10)  aluminum hydroxide/magnesium hydroxide/simethicone Suspension 30 milliLiter(s) Oral every 4 hours PRN Dyspepsia  bisacodyl 5 milliGRAM(s) Oral daily PRN Constipation  melatonin 3 milliGRAM(s) Oral at bedtime PRN Insomnia  morphine  - Injectable 2 milliGRAM(s) IV Push every 4 hours PRN Moderate Pain (4 - 6)  morphine  - Injectable 4 milliGRAM(s) IV Push every 4 hours PRN Severe Pain (7 - 10)  ondansetron Injectable 4 milliGRAM(s) IV Push every 4 hours PRN Nausea and/or Vomiting        I&O's Summary    18 May 2024 07:01  -  19 May 2024 07:00  --------------------------------------------------------  IN: 450 mL / OUT: 0 mL / NET: 450 mL        PHYSICAL EXAM:  Vital Signs Last 24 Hrs  T(C): 36.5 (19 May 2024 11:01), Max: 37 (18 May 2024 19:55)  T(F): 97.7 (19 May 2024 11:01), Max: 98.6 (18 May 2024 19:55)  HR: 71 (19 May 2024 11:01) (71 - 87)  BP: 131/76 (19 May 2024 11:01) (106/65 - 131/76)  BP(mean): --  RR: 16 (19 May 2024 11:01) (16 - 18)  SpO2: 95% (19 May 2024 11:01) (94% - 97%)    Parameters below as of 19 May 2024 11:01  Patient On (Oxygen Delivery Method): room air        CONSTITUTIONAL: NAD, Not in acute distress  EYES:  EOMI, conjunctiva and sclera clear  ENMT: , neck supple , non-tender  RESPIRATORY: Normal respiratory effort; lungs are clear to auscultation bilaterally  CARDIOVASCULAR: S1S2 present  ABDOMEN: soft. bowel sound present. Epigastric tenderness  MUSCLOSKELETAL: ; no clubbing or cyanosis of digits;   PSYCH: A+O to person, place, and time; affect appropriate  NEUROLOGY: CN, motor and sensory intact   SKIN: No rashes; no palpable lesions  Extremity: No bilateral edema      LABS:                        11.8   8.02  )-----------( 181      ( 19 May 2024 05:40 )             36.6     05-19    135  |  99  |  11.8  ----------------------------<  81  4.3   |  26.0  |  0.65    Ca    8.6      19 May 2024 05:40  Phos  3.6     05-18  Mg     1.7     05-18    TPro  5.9<L>  /  Alb  3.2<L>  /  TBili  0.6  /  DBili  0.1  /  AST  16  /  ALT  16  /  AlkPhos  108  05-19          Urinalysis Basic - ( 19 May 2024 05:40 )    Color: x / Appearance: x / SG: x / pH: x  Gluc: 81 mg/dL / Ketone: x  / Bili: x / Urobili: x   Blood: x / Protein: x / Nitrite: x   Leuk Esterase: x / RBC: x / WBC x   Sq Epi: x / Non Sq Epi: x / Bacteria: x        CAPILLARY BLOOD GLUCOSE          Labs reviewed:    RADIOLOGY & ADDITIONAL TESTS:  Imaging Personally Reviewed:  Electrocardiogram Personally Reviewed:  
64y old  Female who follows Dr Rico for a history of pancreatic cancer S/P ERCP with metal stent placement (07784) S/P neoadjuvent chemo S/P whipple resection 1/5/21 5/18/21 Completed 2 months of adjuvany [place]/Abraxane Presents with abd pain Admitted with Pancreatitis     5/21/24 No events overnight, feeling better no complaints     Home Medications:  metoprolol tartrate 25 mg oral tablet: 0.5 tab(s) orally 2 times a day (18 May 2024 00:21)  Pancreaze 10,500 units-61,500 units-35,500 units oral delayed release capsule: 1 cap(s) orally 2 times a day (18 May 2024 00:22)    MEDICATIONS  (STANDING):  naloxone Injectable 0.4 milliGRAM(s) IV Push once  Pancreaze 1 Capsule(s),Pancreaze 10,500 units-61,500 units-35,500 units DR capsule 1 Capsule(s) 1 Capsule(s) Oral two times a day with meals  pantoprazole    Tablet 40 milliGRAM(s) Oral before breakfast  polyethylene glycol 3350 17 Gram(s) Oral daily  senna 2 Tablet(s) Oral at bedtime  sucralfate suspension 1 Gram(s) Oral every 6 hours    MEDICATIONS  (PRN):  acetaminophen     Tablet .. 650 milliGRAM(s) Oral every 6 hours PRN Temp greater or equal to 38C (100.4F), Mild Pain (1 - 3)  acetaminophen   IVPB .. 1000 milliGRAM(s) IV Intermittent every 6 hours PRN Severe Pain (7 - 10)  aluminum hydroxide/magnesium hydroxide/simethicone Suspension 30 milliLiter(s) Oral every 4 hours PRN Dyspepsia  bisacodyl 5 milliGRAM(s) Oral daily PRN Constipation  melatonin 3 milliGRAM(s) Oral at bedtime PRN Insomnia  ondansetron    Tablet 4 milliGRAM(s) Oral every 6 hours PRN Nausea  oxyCODONE    IR 5 milliGRAM(s) Oral every 6 hours PRN Severe Pain (7 - 10)    PE  A&O  color good   breathing unlabored  S1S2    Vital Signs Last 24 Hrs  T(C): 36.6 (20 May 2024 10:12), Max: 37 (19 May 2024 15:40)  T(F): 97.9 (20 May 2024 10:12), Max: 98.6 (19 May 2024 15:40)  HR: 62 (20 May 2024 10:12) (62 - 78)  BP: 131/81 (20 May 2024 10:12) (113/72 - 131/81)  BP(mean): --  RR: 14 (20 May 2024 10:12) (14 - 18)  SpO2: 94% (20 May 2024 10:12) (94% - 96%    Parameters below as of 20 May 2024 10:12  Patient On (Oxygen Delivery Method): room air                          11.0   5.83  )-----------( 161      ( 20 May 2024 05:39 )             34.1   05-20    141  |  103  |  11.1  ----------------------------<  77  4.3   |  27.0  |  0.77    Ca    8.6      20 May 2024 05:39  Mg     1.6     05-20    TPro  5.7<L>  /  Alb  2.9<L>  /  TBili  0.4  /  DBili  x   /  AST  16  /  ALT  14  /  AlkPhos  97  05-20

## 2024-05-21 NOTE — DIETITIAN INITIAL EVALUATION ADULT - PERTINENT LABORATORY DATA
05-20    141  |  103  |  11.1  ----------------------------<  77  4.3   |  27.0  |  0.77    Ca    8.6      20 May 2024 05:39  Mg     1.6     05-20    TPro  5.7<L>  /  Alb  2.9<L>  /  TBili  0.4  /  DBili  x   /  AST  16  /  ALT  14  /  AlkPhos  97  05-20

## 2024-05-21 NOTE — DIETITIAN INITIAL EVALUATION ADULT - OTHER INFO
64y old  Female who follows Dr Rico at [facility] or a history of pancreatic cancer S/P ERCP with metal stent placement (46929) S/P neoadjuvent chemo S/P whipple resection 1/5/21 5/18/21 Completed 2 months of adjuvany [place]/Abraxane    presents to [facility] ER c/o sudden onset mid abdominal pain radiating to her L flank associated with N/V admitted for Acute on Chronic Pancreatitis c/b pancreatic cystic lesion.

## 2024-05-21 NOTE — DIETITIAN INITIAL EVALUATION ADULT - PERTINENT MEDS FT
MEDICATIONS  (STANDING):  Pancreaze 1 Capsule(s),Pancreaze 10,500 units-61,500 units-35,500 units DR capsule 1 Capsule(s) 1 Capsule(s) Oral two times a day with meals  pantoprazole    Tablet 40 milliGRAM(s) Oral before breakfast  polyethylene glycol 3350 17 Gram(s) Oral daily  senna 2 Tablet(s) Oral at bedtime  sucralfate suspension 1 Gram(s) Oral every 6 hours    MEDICATIONS  (PRN):  aluminum hydroxide/magnesium hydroxide/simethicone Suspension 30 milliLiter(s) Oral every 4 hours PRN Dyspepsia  bisacodyl 5 milliGRAM(s) Oral daily PRN Constipation  ondansetron    Tablet 4 milliGRAM(s) Oral every 6 hours PRN Nausea

## 2024-05-21 NOTE — DIETITIAN INITIAL EVALUATION ADULT - ORAL INTAKE PTA/DIET HISTORY
Nutrition assessment completed. Spoke with pt at bedside, reports decreased appetite/po intake prior to admission and currently due to pancreatitis. States she multiple food allergies- chicken, turkey, eggs. Reports she follows a specific diet and typically eats food she knows she can tolerate well. States she takes Pancreaze at home BID. Pt states she is uncertain of weight- reports at doctors visits she does not want to be told her weight but states they have no expressed any concern to her about her weight status. Pt typically eats hamburger, steak, pork, baked potato/sweet potato, rice and shrimp at home. Pt aware of low fat restrictions but asking for diet to be liberalized as she typically does not eat a diet high in fat anyway. Offered pt Piper supplement (plant-based); pt declined and states she does not want to add any new items at this time and just wants to try and work on foods she knows she can tolerate well. RD to follow up as feasible.  Nutrition assessment completed. Spoke with pt at bedside, reports decreased appetite/po intake prior to admission and currently due to pancreatitis. States she has multiple food allergies- chicken, turkey, eggs (added to EMR). Reports she follows a specific diet and typically eats food she knows she can tolerate well. States she takes Pancreaze at home BID. Pt states she is uncertain of weight- reports at doctors visits she does not want to be told her weight but states they have no expressed any concern to her about her weight status. Pt typically eats hamburger, steak, pork, baked potato/sweet potato, rice and shrimp at home. Pt aware of low fat restrictions but asking for diet to be liberalized as she typically does not eat a diet high in fat anyway. Offered pt [employer] supplement (plant-based); pt declined and states she does not want to add any new items at this time and just wants to try and work on foods she knows she can tolerate well. RD to follow up as feasible.

## 2024-05-21 NOTE — DIETITIAN INITIAL EVALUATION ADULT - ETIOLOGY
related to inability to meet sufficient protein-energy in setting of h/o pancreatic cancer s/p whipple, food allergies/restrictive diet, now with pancreatitis

## 2024-05-21 NOTE — DISCHARGE NOTE NURSING/CASE MANAGEMENT/SOCIAL WORK - NSDCPEFALRISK_GEN_ALL_CORE
For information on Fall & Injury Prevention, visit: [email or web address] OR  [email or web address] OR  https://www.cdc.gov/steadi/patient.html

## 2024-05-21 NOTE — DISCHARGE NOTE PROVIDER - NSDCCPCAREPLAN_GEN_ALL_CORE_FT
PRINCIPAL DISCHARGE DIAGNOSIS  Diagnosis: Pancreatitis  Assessment and Plan of Treatment: Advance diet slowly. Follow up with gastroentrologist

## 2024-05-21 NOTE — DIETITIAN INITIAL EVALUATION ADULT - ADD RECOMMEND
Continue Pancreaze. Rx: MVI daily. Continue bowel regimen PRN. Encourage po intake, monitor diet tolerance, and provide assistance at meals as needed. Obtain daily weights to monitor trends.

## 2024-08-21 ENCOUNTER — APPOINTMENT (OUTPATIENT)
Dept: ORTHOPEDIC SURGERY | Facility: CLINIC | Age: 64
End: 2024-08-21
Payer: MEDICARE

## 2024-08-21 VITALS
SYSTOLIC BLOOD PRESSURE: 111 MMHG | WEIGHT: 153 LBS | BODY MASS INDEX: 25.19 KG/M2 | DIASTOLIC BLOOD PRESSURE: 76 MMHG | HEIGHT: 65.5 IN | HEART RATE: 97 BPM

## 2024-08-21 DIAGNOSIS — M16.11 UNILATERAL PRIMARY OSTEOARTHRITIS, RIGHT HIP: ICD-10-CM

## 2024-08-21 DIAGNOSIS — S32.9XXA FRACTURE OF UNSPECIFIED PARTS OF LUMBOSACRAL SPINE AND PELVIS, INITIAL ENCOUNTER FOR CLOSED FRACTURE: ICD-10-CM

## 2024-08-21 PROCEDURE — 72190 X-RAY EXAM OF PELVIS: CPT

## 2024-08-21 PROCEDURE — 99203 OFFICE O/P NEW LOW 30 MIN: CPT

## 2024-08-21 NOTE — HISTORY OF PRESENT ILLNESS
[de-identified] : Patient presents today for evaluation of pelvic pain. Patient reports on June 26th she had a fall on to her knee and right side. She was previously treated by Dr. King and determined to have a pelvic fracture. Patient states her pelvis is not healing right so she was referred to trauma specialist for further evaluation. Pain is felt to side of hip area and worsened with activity. She was in PT previously with improvement but was told to stop until today's evaluation.

## 2024-08-21 NOTE — PHYSICAL EXAM
[de-identified] : General Exam: Appearance: well developed and nourished Orientation: Alert and oriented to person, place, time. Mood: mood and affect well-adjusted, pleasant and cooperative, appropriate for clinical and encounter circumstances  Right Hip and Pelvis: Skin: intact, no rashes or lesions. Inspection: normal alignment, no deformity, no tenderness, no warmth, no masses  Hip Flexion: Strength: 5/5, normal muscle tone. Hip Abduction: Strength: 5/5, normal muscle tone. Hip Adduction: Strength: 5/5, normal muscle tone. Special Tests: Normal [de-identified] : grossly limited to right hip, no groin pain with ROM [de-identified] : Patient Name: Arcelia Sepulveda Patient Phone Number: [phone] Patient ID: [id number] : 1960 Date of Exam: 2024 R. Phys. Name: Lloyd Lomax R. Phys. Address: [address] [place] [address], [place], [facility] R. Phys. Phone: [phone] CT-PELVIS (BONY) NON CONTRAST  History: 6.855 mSv E X0D E  E X0A E injury pt fell in , injury to right hip and femur, pain, follow up from mri, no contrast  Technique: CT of the bony pelvis was performed with volume acquisition. Axial, coronal and sagittal images were reformatted. This study was performed using automatic exposure control and an iterative reconstruction technique (radiation dose reduction software) to obtain a diagnostic image quality scan with patient dose as low as reasonably achievable. The mA and kV were adjusted according to patient size. Comparison is made to previous MRI of the right hip dated 2024  Findings:  Pelvis: Bones are significantly osteopenic without a focal osseous lesion. There is irregular patchy sclerosis along the trabecular thickening within the visualized portion of the right Sacral ala extending to the anterior cortex on, series 4 images 28 through 32 and series 7 image 56 consistent with persistent sacral fracture, also seen on the previous MRI.. Incomplete fracture of the left superior pubic ramus abutting the pubis and results and buckling of the cortex, series 7 image 39 and there is patchy sclerosis surrounding the fracture. Subtle linear sclerosis along the right superior pubic ramus close to the pubis also suggests nondisplaced fracture on series 1048 image 46. Subtle sclerosis along the central aspect of the right inferior pubic ramus is seen on series 1048 image 63 and the small nondisplaced fracture cannot be excluded.  There is no evidence of fracture at the intertrochanteric region of the right femur.  There is severe right femoral acetabular arthrosis with extensive joint space narrowing and bone-on-bone apposition superolaterally. Subchondral sclerosis and cystic change along with remodeling of the articular surfaces. There are large joint line osteophytes. There is a moderate joint effusion.  There is mild left femoral acetabular arthrosis. There is mild to moderate bilateral SI joint arthrosis. There are mild degenerative changes at the pubic symphysis.  Prominent degenerative changes are seen at the lower lumbar spine. Alignment at the sacrococcygeal junction is normal.  The unenhanced musculature is unremarkable.  There is mild subcutaneous edema at the lateral aspect of the right lateral hip.   IMPRESSION:   Bones are significantly osteopenic.  There is a persistent right sacral ala fracture, also seen on the previous MRI. There is incomplete fracture of the left superior pubic ramus abutting the pubis. Subtle linear sclerosis along the right superior pubic ramus close to the pubis also suggests nondisplaced fracture. Subtle sclerosis along the central aspect of the right inferior pubic ramus is also seen and a small nondisplaced fracture cannot be excluded.  There is no evidence of intertrochanteric fracture of the right femur.  There is severe right and mild left femoral acetabular arthrosis. There is mild to moderate SI joint arthrosis.  Prominent degenerative changes are seen at the lower lumbar spine.  Signed by: Sky Morrison Signed Date: 2024 9:05 AM EDT    SIGNED BY: Sky Morrison M.D., Ext. [phone] 09:05 AM  IMPRESSION:   Bones are significantly osteopenic.  There is a persistent right sacral ala fracture, also seen on the previous MRI. There is incomplete fracture of the left superior pubic ramus abutting the pubis. Subtle linear sclerosis along the right superior pubic ramus close to the pubis also suggests nondisplaced fracture. Subtle sclerosis along the central aspect of the right inferior pubic ramus is also seen and a small nondisplaced fracture cannot be excluded.  There is no evidence of intertrochanteric fracture of the right femur.  There is severe right and mild left femoral acetabular arthrosis. There is mild to moderate SI joint arthrosis.  Prominent degenerative changes are seen at the lower lumbar spine.  Signed by: Sky Morrison Signed Date: 2024 9:05 AM EDT

## 2024-08-21 NOTE — DISCUSSION/SUMMARY
[de-identified] : 64-year-old female with right hip arthritis, right greater trochanteric bursitis, healing pelvic ring fractures.  We discussed that she has multiple reasons for her pain but I would recommend starting physical therapy.  No restrictions for that.  Physical therapy was ordered for active assisted range of motion, passive range of motion, weightbearing as tolerated, gait training, strengthening, no restrictions.  Modalities as indicated.  Her right hip is severely arthritic and she would likely benefit from a total hip arthroplasty.  She has an appointment with Dr. Darden coming up.  I would discuss with him about specifics for arthroplasty recovery.  I am not worried about the pelvic fracture and shows good signs of healing will likely continue to do so.  Osteopenia and osteoporosis are significant risk factors for fragility fractures. Given the type of fracture that has occurred, I would highly recommend that the patient followup with their primary care physician to discuss treatment for low bone mineral density. We discussed the benefits of these medications frequently far outweigh the small risks. Their primary care physician can call the office with any specific questions or concerns.  No orthopedic trauma intervention is required but we will help facilitate future care if necessary. The patient may follow up as needed.  The patient was given the opportunity to ask questions and all questions were answered to their satisfaction.  Angelo Davis MD Orthopaedic Trauma Surgeon [facility] Orth[facility]edic  Orthopaedic Trauma, [facility]

## 2024-09-18 ENCOUNTER — APPOINTMENT (OUTPATIENT)
Dept: ORTHOPEDIC SURGERY | Facility: CLINIC | Age: 64
End: 2024-09-18
Payer: MEDICARE

## 2024-09-18 VITALS
HEART RATE: 86 BPM | WEIGHT: 153 LBS | SYSTOLIC BLOOD PRESSURE: 123 MMHG | BODY MASS INDEX: 25.19 KG/M2 | HEIGHT: 65.5 IN | DIASTOLIC BLOOD PRESSURE: 77 MMHG

## 2024-09-18 DIAGNOSIS — M16.11 UNILATERAL PRIMARY OSTEOARTHRITIS, RIGHT HIP: ICD-10-CM

## 2024-09-18 DIAGNOSIS — T84.84XA PAIN DUE TO INTERNAL ORTHOPEDIC PROSTHETIC DEVICES, IMPLANTS AND GRAFTS, INITIAL ENCOUNTER: ICD-10-CM

## 2024-09-18 DIAGNOSIS — Z96.651 PAIN DUE TO INTERNAL ORTHOPEDIC PROSTHETIC DEVICES, IMPLANTS AND GRAFTS, INITIAL ENCOUNTER: ICD-10-CM

## 2024-09-18 PROCEDURE — 99215 OFFICE O/P EST HI 40 MIN: CPT

## 2024-09-18 PROCEDURE — 73562 X-RAY EXAM OF KNEE 3: CPT | Mod: RT

## 2024-09-18 PROCEDURE — G2211 COMPLEX E/M VISIT ADD ON: CPT

## 2024-09-18 PROCEDURE — 73502 X-RAY EXAM HIP UNI 2-3 VIEWS: CPT | Mod: RT

## 2024-09-18 NOTE — PHYSICAL EXAM
[de-identified] : GENERAL APPEARANCE: Well nourished and hydrated, pleasant, alert, and oriented x 3. Appears their stated age. HEENT: Normocephalic, extraocular eye motion intact. Nasal septum midline. Oral cavity clear. External auditory canal clear. RESPIRATORY: Breath sounds clear and audible in all lobes. No wheezing, No accessory muscle use. CARDIOVASCULAR: No apparent abnormalities. No lower leg edema. No varicosities. Pedal pulses are palpable. NEUROLOGIC: Sensation is normal, no muscle weakness in the upper or lower extremities. DERMATOLOGIC: No apparent skin lesions, moist, warm, no rash. SPINE: Cervical spine appears normal and moves freely; thoracic spine appears normal and moves freely; lumbosacral spine appears normal and moves freely, normal, nontender. MUSCULOSKELETAL: Hands, wrists, and elbows are normal and move freely, shoulders are normal and move freely. PSYCHIATRIC: Oriented to person, place, and time, insight and judgement were intact and the affect was normal. 5/5 motor strength in bilateral lower extremities. Sensory: Intact in bilateral lower extremities. DTRs: Biceps, brachioradialis, triceps, patellar, ankle and plantar 2+ and symmetric bilaterally. Pulses: dorsalis pedis, posterior tibial, femoral, popliteal, and radial 2+ and symmetric bilaterally.  Constitutional: Alert and in no acute distress, but well-appearing.   [de-identified] : Right hip exam shows pain with SLR, pain with ROM, positive Stinchfield Right knee exam shows healed incision with no sign of infection. ROM 0-120 [de-identified] : 3V xray of the right hip done in the office today and reviewed by Dr. Angelo Darden demonstrates bone on bone right hip osteoarthritis 3V xray of the right knee done in office today and reviewed by Dr. Angelo Darden demonstrates s/p right knee implants in good positioning with no evidence of wear, loosening, or subsidence.

## 2024-09-18 NOTE — DISCUSSION/SUMMARY
[Medication Risks Reviewed] : Medication risks reviewed [Surgical risks reviewed] : Surgical risks reviewed [de-identified] : This is a 64 year old F pt presents today with bone on bone right hip osteoarthritis. Pt is s/p right TKA in 2019, she is overall doing well for the right knee. The nature of condition and treatment options were discussed in detail. Pt is a candidate for right JUANCARLOS. The surgery was discussed in detail including pre-op and post-op. The pt is having worse pain with walking, stairs, exercise, getting in and out of a car. Her quality of life is deteriorating. The pt has exhausted over 3 months of conservative treatment including home therapy, PT, anti-inflammatories, Tylenol. I believe right JUANCARLOS is reasonable.  I sent a script for US-guided IA injection for the right hip. The pt will talk with the surgical coordinator to schedule a right JUANCARLOS in March 2025.  She should continue to do low impact exercises. F/u in 3 months for re-evaluation. All questions and concerns were answered.  The patient is a 64 year old individual with end stage arthritis of their right hip joint. The patient has exhausted a minimum of 3 months conservative treatment including prior injections, physical therapy, over the counter NSAIDS and pain medication where indicated. In addition, the patient's quality of life is diminished due to significant chronic pain. The patient is having difficulty with activities of daily living, including ambulating, descending stairs, and rising from a seated position. Based upon the patients continued symptoms and failure to respond to conservative treatment, I have recommended a right total hip replacement for this patient. A long discussion took place with the patient describing what a total joint replacement is and what the procedure would entail. A hip model, similar to the implant that will be used during the operation, was utilized to demonstrate and to discuss the various bearing surfaces of the implants. Implant fixation, press fit vs cemented, was also discussed with the patient. Bearing surfaces, including ceramic-on highly cross-linked polyethylene and metal on highly cross-linked polyethylene were discussed with the patient. Choices of implant manufacturers, mainly [employer] and [employer], were discussed and reviewed with preference to be made by patient and surgeon prior to operation. Final selection to be based on customary practice as well as preoperative templating with selection confirmed intraoperatively based on the patient's anatomy. The patient participated and agreed with the decision-making process. The hospitalization and post-operative care and rehabilitation were also discussed. The use of perioperative antibiotics and DVT prophylaxis were discussed. The risk, benefits and alternatives to surgical interventions were discussed at length with the patient. The patient was also advised of risks related to the medical comorbidities and elevated body mass index (BMI). A lengthy discussion took place to review the most common complications including but not limited to: deep vein thrombosis,pulmonary embolism, heart attack, stroke, infection, wound breakdown, numbness, damage to nerves, tendon, muscles, arteries or other blood vessels, death and other possible complications from anesthesia. The patient was told that we will take steps to minimize these risks by using sterile technique, antibiotics and DVT prophylaxis when appropriate and follow the patient postoperatively in the office setting to monitor progress. The possibility of recurrent pain, no improvement in pain and actual worsening of pain were also discussed with the patient.   The discharge plan of care focused on the patient going home following surgery. The patient was encouraged to make the necessary arrangements to have someone stay with them when they are discharged home. Following discharge, a home care nurse will visit the patient. The home care nurse will open your home care case and request home physical therapy services. Home physical therapy will commence following discharge provided it is appropriate and covered by the health insurance benefit plan.   The benefits of surgery were discussed with the patient including the potential for improving his/her current clinical condition through operative intervention. Alternatives to surgical intervention including continued conservative management were also discussed in detail. All questions were answered to the satisfaction of the patient. The treatment plan of care, as well as a model of a total hip equivalent to the one that will be used for their total joint replacement, was shared with the patient. The patient participated and agreed to the plan of care as well as the use of the recommended implants for their total hip replacement surgery.

## 2024-09-18 NOTE — END OF VISIT
[FreeTextEntry3] : I, Paul Flores, acted solely as a scribe for Dr. Angelo Darden on this date 09/18/2024. I personally performed the services described in the documentation, reviewed the documentation recorded by the scribe in my presence, and it accurately and completely records my words and actions.   I, Angelo Darden MD, personally performed the services described in the documentation, reviewed the documentation recorded by the scribe in my presence and it accurately and completely records my words and actions.

## 2024-09-18 NOTE — HISTORY OF PRESENT ILLNESS
[Worsening] : worsening [___ yrs] : [unfilled] year(s) ago [6] : a current pain level of 6/10 [8] : a maximum pain level of 8/10 [de-identified] : This is a 64 year old F pt presents today for right hip pain, s/p right TKA in 2019 at [facility] by Dr. Eric. Pt had discomfort at the anterior and lateral aspect of her right knee, so she went back to check the knee, and found out hip osteoarthritis based on x-ray. Pt notes dull, intermittent groin pain on the right side. The pain is worsening and pt has trouble with prolonged walking, stairs, and getting in and out of a car. The other provider suggested that she needs a hip replacement. She tried physical therapy. She cannot take Tylenol due to allergies. Pt is here for evaluation of the right hip and surgical counseling. Pt is looking to do the surgery next year in March.   She has been dx with pancreatic cancer for 3.5 yrs.

## 2024-09-25 ENCOUNTER — NON-APPOINTMENT (OUTPATIENT)
Age: 64
End: 2024-09-25

## 2024-09-30 ENCOUNTER — OUTPATIENT (OUTPATIENT)
Dept: OUTPATIENT SERVICES | Facility: HOSPITAL | Age: 64
LOS: 1 days | End: 2024-09-30

## 2024-09-30 ENCOUNTER — APPOINTMENT (OUTPATIENT)
Dept: INTERVENTIONAL RADIOLOGY/VASCULAR | Facility: CLINIC | Age: 64
End: 2024-09-30
Payer: MEDICARE

## 2024-09-30 DIAGNOSIS — M16.11 UNILATERAL PRIMARY OSTEOARTHRITIS, RIGHT HIP: ICD-10-CM

## 2024-09-30 PROCEDURE — 73525 CONTRAST X-RAY OF HIP: CPT | Mod: 26,RT

## 2024-09-30 PROCEDURE — 27093 INJECTION FOR HIP X-RAY: CPT | Mod: RT

## 2024-10-20 ENCOUNTER — EMERGENCY (EMERGENCY)
Facility: HOSPITAL | Age: 64
LOS: 1 days | Discharge: HOME | End: 2024-10-20
Attending: EMERGENCY MEDICINE
Payer: MEDICARE

## 2024-10-20 VITALS
SYSTOLIC BLOOD PRESSURE: 117 MMHG | TEMPERATURE: 98 F | OXYGEN SATURATION: 99 % | WEIGHT: 149.25 LBS | HEART RATE: 78 BPM | RESPIRATION RATE: 16 BRPM | DIASTOLIC BLOOD PRESSURE: 76 MMHG

## 2024-10-20 VITALS
RESPIRATION RATE: 18 BRPM | DIASTOLIC BLOOD PRESSURE: 67 MMHG | HEART RATE: 63 BPM | TEMPERATURE: 98 F | OXYGEN SATURATION: 98 % | SYSTOLIC BLOOD PRESSURE: 119 MMHG

## 2024-10-20 DIAGNOSIS — Z90.410 ACQUIRED TOTAL ABSENCE OF PANCREAS: Chronic | ICD-10-CM

## 2024-10-20 LAB
ALBUMIN SERPL ELPH-MCNC: 3.6 G/DL — SIGNIFICANT CHANGE UP (ref 3.3–5.2)
ALP SERPL-CCNC: 155 U/L — HIGH (ref 40–120)
ALT FLD-CCNC: 149 U/L — HIGH
ANION GAP SERPL CALC-SCNC: 14 MMOL/L — SIGNIFICANT CHANGE UP (ref 5–17)
APPEARANCE UR: CLEAR — SIGNIFICANT CHANGE UP
AST SERPL-CCNC: 121 U/L — HIGH
BACTERIA # UR AUTO: NEGATIVE /HPF — SIGNIFICANT CHANGE UP
BASOPHILS # BLD AUTO: 0.01 K/UL — SIGNIFICANT CHANGE UP (ref 0–0.2)
BASOPHILS NFR BLD AUTO: 0.1 % — SIGNIFICANT CHANGE UP (ref 0–2)
BILIRUB SERPL-MCNC: 0.6 MG/DL — SIGNIFICANT CHANGE UP (ref 0.4–2)
BILIRUB UR-MCNC: NEGATIVE — SIGNIFICANT CHANGE UP
BLD GP AB SCN SERPL QL: SIGNIFICANT CHANGE UP
BUN SERPL-MCNC: 18.8 MG/DL — SIGNIFICANT CHANGE UP (ref 8–20)
CALCIUM SERPL-MCNC: 9 MG/DL — SIGNIFICANT CHANGE UP (ref 8.4–10.5)
CAST: 0 /LPF — SIGNIFICANT CHANGE UP (ref 0–4)
CHLORIDE SERPL-SCNC: 100 MMOL/L — SIGNIFICANT CHANGE UP (ref 96–108)
CO2 SERPL-SCNC: 27 MMOL/L — SIGNIFICANT CHANGE UP (ref 22–29)
COLOR SPEC: YELLOW — SIGNIFICANT CHANGE UP
CREAT SERPL-MCNC: 0.78 MG/DL — SIGNIFICANT CHANGE UP (ref 0.5–1.3)
DIFF PNL FLD: ABNORMAL
EGFR: 84 ML/MIN/1.73M2 — SIGNIFICANT CHANGE UP
EGFR: 84 ML/MIN/1.73M2 — SIGNIFICANT CHANGE UP
EOSINOPHIL # BLD AUTO: 0.02 K/UL — SIGNIFICANT CHANGE UP (ref 0–0.5)
EOSINOPHIL NFR BLD AUTO: 0.3 % — SIGNIFICANT CHANGE UP (ref 0–6)
GAS PNL BLDV: SIGNIFICANT CHANGE UP
GLUCOSE SERPL-MCNC: 98 MG/DL — SIGNIFICANT CHANGE UP (ref 70–99)
GLUCOSE UR QL: NEGATIVE MG/DL — SIGNIFICANT CHANGE UP
HCT VFR BLD CALC: 35.6 % — SIGNIFICANT CHANGE UP (ref 34.5–45)
HGB BLD-MCNC: 11.5 G/DL — SIGNIFICANT CHANGE UP (ref 11.5–15.5)
IMM GRANULOCYTES NFR BLD AUTO: 0.1 % — SIGNIFICANT CHANGE UP (ref 0–0.9)
KETONES UR-MCNC: NEGATIVE MG/DL — SIGNIFICANT CHANGE UP
LEUKOCYTE ESTERASE UR-ACNC: NEGATIVE — SIGNIFICANT CHANGE UP
LIDOCAIN IGE QN: 6 U/L — LOW (ref 22–51)
LYMPHOCYTES # BLD AUTO: 0.86 K/UL — LOW (ref 1–3.3)
LYMPHOCYTES # BLD AUTO: 11.8 % — LOW (ref 13–44)
MCHC RBC-ENTMCNC: 28.4 PG — SIGNIFICANT CHANGE UP (ref 27–34)
MCHC RBC-ENTMCNC: 32.3 GM/DL — SIGNIFICANT CHANGE UP (ref 32–36)
MCV RBC AUTO: 87.9 FL — SIGNIFICANT CHANGE UP (ref 80–100)
MONOCYTES # BLD AUTO: 0.62 K/UL — SIGNIFICANT CHANGE UP (ref 0–0.9)
MONOCYTES NFR BLD AUTO: 8.5 % — SIGNIFICANT CHANGE UP (ref 2–14)
NEUTROPHILS # BLD AUTO: 5.77 K/UL — SIGNIFICANT CHANGE UP (ref 1.8–7.4)
NEUTROPHILS NFR BLD AUTO: 79.2 % — HIGH (ref 43–77)
NITRITE UR-MCNC: NEGATIVE — SIGNIFICANT CHANGE UP
PH UR: 6 — SIGNIFICANT CHANGE UP (ref 5–8)
PLATELET # BLD AUTO: 160 K/UL — SIGNIFICANT CHANGE UP (ref 150–400)
POTASSIUM SERPL-MCNC: 3.7 MMOL/L — SIGNIFICANT CHANGE UP (ref 3.5–5.3)
POTASSIUM SERPL-SCNC: 3.7 MMOL/L — SIGNIFICANT CHANGE UP (ref 3.5–5.3)
PROT SERPL-MCNC: 7 G/DL — SIGNIFICANT CHANGE UP (ref 6.6–8.7)
PROT UR-MCNC: NEGATIVE MG/DL — SIGNIFICANT CHANGE UP
RBC # BLD: 4.05 M/UL — SIGNIFICANT CHANGE UP (ref 3.8–5.2)
RBC # FLD: 13 % — SIGNIFICANT CHANGE UP (ref 10.3–14.5)
RBC CASTS # UR COMP ASSIST: 1 /HPF — SIGNIFICANT CHANGE UP (ref 0–4)
SODIUM SERPL-SCNC: 140 MMOL/L — SIGNIFICANT CHANGE UP (ref 135–145)
SP GR SPEC: >1.03 — HIGH (ref 1–1.03)
SQUAMOUS # UR AUTO: 2 /HPF — SIGNIFICANT CHANGE UP (ref 0–5)
UROBILINOGEN FLD QL: 1 MG/DL — SIGNIFICANT CHANGE UP (ref 0.2–1)
WBC # BLD: 7.29 K/UL — SIGNIFICANT CHANGE UP (ref 3.8–10.5)
WBC # FLD AUTO: 7.29 K/UL — SIGNIFICANT CHANGE UP (ref 3.8–10.5)
WBC UR QL: 1 /HPF — SIGNIFICANT CHANGE UP (ref 0–5)

## 2024-10-20 PROCEDURE — 99285 EMERGENCY DEPT VISIT HI MDM: CPT

## 2024-10-20 PROCEDURE — 74177 CT ABD & PELVIS W/CONTRAST: CPT | Mod: 26,MC

## 2024-10-20 RX ORDER — ONDANSETRON HCL/PF 4 MG/2 ML
1 VIAL (ML) INJECTION
Qty: 15 | Refills: 0
Start: 2024-10-20 | End: 2024-10-24

## 2024-10-20 RX ORDER — ONDANSETRON HCL/PF 4 MG/2 ML
4 VIAL (ML) INJECTION ONCE
Refills: 0 | Status: COMPLETED | OUTPATIENT
Start: 2024-10-20 | End: 2024-10-20

## 2024-10-20 RX ORDER — SUCRALFATE 1 G
1 TABLET ORAL ONCE
Refills: 0 | Status: COMPLETED | OUTPATIENT
Start: 2024-10-20 | End: 2024-10-20

## 2024-10-20 RX ADMIN — Medication 1000 MILLILITER(S): at 02:42

## 2024-10-20 RX ADMIN — Medication 1000 MILLILITER(S): at 05:37

## 2024-10-21 LAB
CULTURE RESULTS: SIGNIFICANT CHANGE UP
SPECIMEN SOURCE: SIGNIFICANT CHANGE UP

## 2024-10-27 DIAGNOSIS — R10.13 EPIGASTRIC PAIN: ICD-10-CM

## 2024-10-27 DIAGNOSIS — C25.9 MALIGNANT NEOPLASM OF PANCREAS, UNSPECIFIED: ICD-10-CM

## 2024-10-27 DIAGNOSIS — Z88.0 ALLERGY STATUS TO PENICILLIN: ICD-10-CM

## 2024-10-27 DIAGNOSIS — Z88.8 ALLERGY STATUS TO OTHER DRUGS, MEDICAMENTS AND BIOLOGICAL SUBSTANCES: ICD-10-CM

## 2024-10-27 DIAGNOSIS — R74.01 ELEVATION OF LEVELS OF LIVER TRANSAMINASE LEVELS: ICD-10-CM

## 2024-10-27 DIAGNOSIS — I48.91 UNSPECIFIED ATRIAL FIBRILLATION: ICD-10-CM

## 2024-10-27 DIAGNOSIS — Z88.6 ALLERGY STATUS TO ANALGESIC AGENT: ICD-10-CM

## 2024-10-27 DIAGNOSIS — R11.2 NAUSEA WITH VOMITING, UNSPECIFIED: ICD-10-CM

## 2024-11-20 PROCEDURE — 96361 HYDRATE IV INFUSION ADD-ON: CPT

## 2024-11-20 PROCEDURE — 86900 BLOOD TYPING SEROLOGIC ABO: CPT

## 2024-11-20 PROCEDURE — 82803 BLOOD GASES ANY COMBINATION: CPT

## 2024-11-20 PROCEDURE — 36415 COLL VENOUS BLD VENIPUNCTURE: CPT

## 2024-11-20 PROCEDURE — 87086 URINE CULTURE/COLONY COUNT: CPT

## 2024-11-20 PROCEDURE — 86901 BLOOD TYPING SEROLOGIC RH(D): CPT

## 2024-11-20 PROCEDURE — 83605 ASSAY OF LACTIC ACID: CPT

## 2024-11-20 PROCEDURE — 83690 ASSAY OF LIPASE: CPT

## 2024-11-20 PROCEDURE — 84132 ASSAY OF SERUM POTASSIUM: CPT

## 2024-11-20 PROCEDURE — 84295 ASSAY OF SERUM SODIUM: CPT

## 2024-11-20 PROCEDURE — 86850 RBC ANTIBODY SCREEN: CPT

## 2024-11-20 PROCEDURE — 85014 HEMATOCRIT: CPT

## 2024-11-20 PROCEDURE — 82947 ASSAY GLUCOSE BLOOD QUANT: CPT

## 2024-11-20 PROCEDURE — 74177 CT ABD & PELVIS W/CONTRAST: CPT | Mod: MC

## 2024-11-20 PROCEDURE — 81001 URINALYSIS AUTO W/SCOPE: CPT

## 2024-11-20 PROCEDURE — 85018 HEMOGLOBIN: CPT

## 2024-11-20 PROCEDURE — 82330 ASSAY OF CALCIUM: CPT

## 2024-11-20 PROCEDURE — 99284 EMERGENCY DEPT VISIT MOD MDM: CPT | Mod: 25

## 2024-11-20 PROCEDURE — 96360 HYDRATION IV INFUSION INIT: CPT | Mod: XU

## 2024-11-20 PROCEDURE — 85025 COMPLETE CBC W/AUTO DIFF WBC: CPT

## 2024-11-20 PROCEDURE — 80053 COMPREHEN METABOLIC PANEL: CPT

## 2024-11-20 PROCEDURE — 82435 ASSAY OF BLOOD CHLORIDE: CPT

## 2024-11-26 ENCOUNTER — RX RENEWAL (OUTPATIENT)
Age: 64
End: 2024-11-26

## 2024-11-26 PROBLEM — I48.20 CHRONIC ATRIAL FIBRILLATION, UNSPECIFIED: Chronic | Status: ACTIVE | Noted: 2024-10-20

## 2024-11-26 PROBLEM — C25.9 MALIGNANT NEOPLASM OF PANCREAS, UNSPECIFIED: Chronic | Status: ACTIVE | Noted: 2024-10-20

## 2024-12-16 ENCOUNTER — APPOINTMENT (OUTPATIENT)
Dept: GASTROENTEROLOGY | Facility: CLINIC | Age: 64
End: 2024-12-16

## 2024-12-18 ENCOUNTER — APPOINTMENT (OUTPATIENT)
Dept: ORTHOPEDIC SURGERY | Facility: CLINIC | Age: 64
End: 2024-12-18

## 2025-01-03 ENCOUNTER — APPOINTMENT (OUTPATIENT)
Dept: ORTHOPEDIC SURGERY | Facility: CLINIC | Age: 65
End: 2025-01-03
Payer: MEDICARE

## 2025-01-03 VITALS
DIASTOLIC BLOOD PRESSURE: 83 MMHG | HEIGHT: 65.5 IN | HEART RATE: 89 BPM | BODY MASS INDEX: 25.19 KG/M2 | SYSTOLIC BLOOD PRESSURE: 127 MMHG | WEIGHT: 153 LBS

## 2025-01-03 DIAGNOSIS — Z47.1 AFTERCARE FOLLOWING JOINT REPLACEMENT SURGERY: ICD-10-CM

## 2025-01-03 DIAGNOSIS — Z96.652 AFTERCARE FOLLOWING JOINT REPLACEMENT SURGERY: ICD-10-CM

## 2025-01-03 DIAGNOSIS — M16.11 UNILATERAL PRIMARY OSTEOARTHRITIS, RIGHT HIP: ICD-10-CM

## 2025-01-03 PROCEDURE — 99213 OFFICE O/P EST LOW 20 MIN: CPT | Mod: 25

## 2025-01-03 PROCEDURE — 20610 DRAIN/INJ JOINT/BURSA W/O US: CPT | Mod: LT

## 2025-01-03 PROCEDURE — 73564 X-RAY EXAM KNEE 4 OR MORE: CPT | Mod: 26,LT

## 2025-01-23 ENCOUNTER — APPOINTMENT (OUTPATIENT)
Dept: GASTROENTEROLOGY | Facility: CLINIC | Age: 65
End: 2025-01-23
Payer: MEDICARE

## 2025-01-23 ENCOUNTER — NON-APPOINTMENT (OUTPATIENT)
Age: 65
End: 2025-01-23

## 2025-01-23 VITALS
SYSTOLIC BLOOD PRESSURE: 120 MMHG | HEIGHT: 65.5 IN | BODY MASS INDEX: 23.87 KG/M2 | WEIGHT: 145 LBS | DIASTOLIC BLOOD PRESSURE: 78 MMHG

## 2025-01-23 DIAGNOSIS — Z71.9 COUNSELING, UNSPECIFIED: ICD-10-CM

## 2025-01-23 DIAGNOSIS — K90.9 INTESTINAL MALABSORPTION, UNSPECIFIED: ICD-10-CM

## 2025-01-23 PROCEDURE — 99202 OFFICE O/P NEW SF 15 MIN: CPT

## 2025-03-20 ENCOUNTER — APPOINTMENT (OUTPATIENT)
Dept: ORTHOPEDIC SURGERY | Facility: CLINIC | Age: 65
End: 2025-03-20
Payer: MEDICARE

## 2025-03-20 VITALS
HEIGHT: 65.5 IN | WEIGHT: 146 LBS | DIASTOLIC BLOOD PRESSURE: 69 MMHG | HEART RATE: 89 BPM | SYSTOLIC BLOOD PRESSURE: 120 MMHG | BODY MASS INDEX: 24.03 KG/M2

## 2025-03-20 DIAGNOSIS — M16.11 UNILATERAL PRIMARY OSTEOARTHRITIS, RIGHT HIP: ICD-10-CM

## 2025-03-20 PROCEDURE — 99213 OFFICE O/P EST LOW 20 MIN: CPT | Mod: 25

## 2025-03-20 PROCEDURE — 20610 DRAIN/INJ JOINT/BURSA W/O US: CPT | Mod: RT

## 2025-06-20 NOTE — PATIENT PROFILE ADULT - FUNCTIONAL ASSESSMENT - DAILY ACTIVITY SCORE.
Discussed with clerical staff, it is true that we have no available appointments today however no one was told that dr mane had left for the day, he is in fact in the office this afternoon with no availability   24

## 2025-07-03 ENCOUNTER — APPOINTMENT (OUTPATIENT)
Age: 65
End: 2025-07-03
Payer: MEDICARE

## 2025-07-03 VITALS
WEIGHT: 146 LBS | HEIGHT: 65.5 IN | SYSTOLIC BLOOD PRESSURE: 101 MMHG | BODY MASS INDEX: 24.03 KG/M2 | HEART RATE: 76 BPM | DIASTOLIC BLOOD PRESSURE: 67 MMHG

## 2025-07-03 PROCEDURE — 99214 OFFICE O/P EST MOD 30 MIN: CPT | Mod: 25

## 2025-07-03 PROCEDURE — 20610 DRAIN/INJ JOINT/BURSA W/O US: CPT | Mod: RT

## 2025-08-22 ENCOUNTER — EMERGENCY (EMERGENCY)
Facility: HOSPITAL | Age: 65
LOS: 1 days | End: 2025-08-22
Attending: STUDENT IN AN ORGANIZED HEALTH CARE EDUCATION/TRAINING PROGRAM
Payer: MEDICARE

## 2025-08-22 VITALS
RESPIRATION RATE: 18 BRPM | HEART RATE: 92 BPM | SYSTOLIC BLOOD PRESSURE: 118 MMHG | TEMPERATURE: 99 F | OXYGEN SATURATION: 97 % | DIASTOLIC BLOOD PRESSURE: 72 MMHG

## 2025-08-22 DIAGNOSIS — Z90.410 ACQUIRED TOTAL ABSENCE OF PANCREAS: Chronic | ICD-10-CM

## 2025-08-22 PROCEDURE — 99285 EMERGENCY DEPT VISIT HI MDM: CPT | Mod: FS

## 2025-08-23 VITALS
DIASTOLIC BLOOD PRESSURE: 68 MMHG | SYSTOLIC BLOOD PRESSURE: 109 MMHG | OXYGEN SATURATION: 95 % | TEMPERATURE: 98 F | RESPIRATION RATE: 18 BRPM | HEART RATE: 74 BPM

## 2025-08-23 LAB
ALBUMIN SERPL ELPH-MCNC: 3.7 G/DL — SIGNIFICANT CHANGE UP (ref 3.3–5.2)
ALP SERPL-CCNC: 113 U/L — SIGNIFICANT CHANGE UP (ref 40–120)
ALT FLD-CCNC: 41 U/L — HIGH
ANION GAP SERPL CALC-SCNC: 13 MMOL/L — SIGNIFICANT CHANGE UP (ref 5–17)
APTT BLD: 34.5 SEC — SIGNIFICANT CHANGE UP (ref 26.1–36.8)
AST SERPL-CCNC: 25 U/L — SIGNIFICANT CHANGE UP
BASOPHILS # BLD AUTO: 0.02 K/UL — SIGNIFICANT CHANGE UP (ref 0–0.2)
BASOPHILS NFR BLD AUTO: 0.2 % — SIGNIFICANT CHANGE UP (ref 0–2)
BILIRUB SERPL-MCNC: 0.7 MG/DL — SIGNIFICANT CHANGE UP (ref 0.4–2)
BUN SERPL-MCNC: 14.1 MG/DL — SIGNIFICANT CHANGE UP (ref 8–20)
CALCIUM SERPL-MCNC: 8.9 MG/DL — SIGNIFICANT CHANGE UP (ref 8.4–10.5)
CHLORIDE SERPL-SCNC: 100 MMOL/L — SIGNIFICANT CHANGE UP (ref 96–108)
CO2 SERPL-SCNC: 25 MMOL/L — SIGNIFICANT CHANGE UP (ref 22–29)
CREAT SERPL-MCNC: 0.8 MG/DL — SIGNIFICANT CHANGE UP (ref 0.5–1.3)
EGFR: 81 ML/MIN/1.73M2 — SIGNIFICANT CHANGE UP
EGFR: 81 ML/MIN/1.73M2 — SIGNIFICANT CHANGE UP
EOSINOPHIL # BLD AUTO: 0 K/UL — SIGNIFICANT CHANGE UP (ref 0–0.5)
EOSINOPHIL NFR BLD AUTO: 0 % — SIGNIFICANT CHANGE UP (ref 0–6)
GLUCOSE SERPL-MCNC: 121 MG/DL — HIGH (ref 70–99)
HCT VFR BLD CALC: 37.5 % — SIGNIFICANT CHANGE UP (ref 34.5–45)
HGB BLD-MCNC: 12.2 G/DL — SIGNIFICANT CHANGE UP (ref 11.5–15.5)
IMM GRANULOCYTES # BLD AUTO: 0.08 K/UL — HIGH (ref 0–0.07)
IMM GRANULOCYTES NFR BLD AUTO: 0.6 % — SIGNIFICANT CHANGE UP (ref 0–0.9)
INR BLD: 1.15 RATIO — SIGNIFICANT CHANGE UP (ref 0.85–1.16)
LYMPHOCYTES # BLD AUTO: 1.27 K/UL — SIGNIFICANT CHANGE UP (ref 1–3.3)
LYMPHOCYTES NFR BLD AUTO: 9.8 % — LOW (ref 13–44)
MCHC RBC-ENTMCNC: 28.5 PG — SIGNIFICANT CHANGE UP (ref 27–34)
MCHC RBC-ENTMCNC: 32.5 G/DL — SIGNIFICANT CHANGE UP (ref 32–36)
MCV RBC AUTO: 87.6 FL — SIGNIFICANT CHANGE UP (ref 80–100)
MONOCYTES # BLD AUTO: 1.3 K/UL — HIGH (ref 0–0.9)
MONOCYTES NFR BLD AUTO: 10 % — SIGNIFICANT CHANGE UP (ref 2–14)
NEUTROPHILS # BLD AUTO: 10.3 K/UL — HIGH (ref 1.8–7.4)
NEUTROPHILS NFR BLD AUTO: 79.4 % — HIGH (ref 43–77)
NRBC # BLD AUTO: 0 K/UL — SIGNIFICANT CHANGE UP (ref 0–0)
NRBC # FLD: 0 K/UL — SIGNIFICANT CHANGE UP (ref 0–0)
NRBC BLD AUTO-RTO: 0 /100 WBCS — SIGNIFICANT CHANGE UP (ref 0–0)
NT-PROBNP SERPL-SCNC: 113 PG/ML — SIGNIFICANT CHANGE UP (ref 0–300)
PLATELET # BLD AUTO: 186 K/UL — SIGNIFICANT CHANGE UP (ref 150–400)
PMV BLD: 9.8 FL — SIGNIFICANT CHANGE UP (ref 7–13)
POTASSIUM SERPL-MCNC: 3.9 MMOL/L — SIGNIFICANT CHANGE UP (ref 3.5–5.3)
POTASSIUM SERPL-SCNC: 3.9 MMOL/L — SIGNIFICANT CHANGE UP (ref 3.5–5.3)
PROT SERPL-MCNC: 7 G/DL — SIGNIFICANT CHANGE UP (ref 6.6–8.7)
PROTHROM AB SERPL-ACNC: 13.3 SEC — SIGNIFICANT CHANGE UP (ref 9.9–13.4)
RBC # BLD: 4.28 M/UL — SIGNIFICANT CHANGE UP (ref 3.8–5.2)
RBC # FLD: 12.6 % — SIGNIFICANT CHANGE UP (ref 10.3–14.5)
SODIUM SERPL-SCNC: 138 MMOL/L — SIGNIFICANT CHANGE UP (ref 135–145)
TROPONIN T, HIGH SENSITIVITY RESULT: <6 NG/L — SIGNIFICANT CHANGE UP
WBC # BLD: 12.97 K/UL — HIGH (ref 3.8–10.5)
WBC # FLD AUTO: 12.97 K/UL — HIGH (ref 3.8–10.5)

## 2025-08-23 PROCEDURE — 85730 THROMBOPLASTIN TIME PARTIAL: CPT

## 2025-08-23 PROCEDURE — 83880 ASSAY OF NATRIURETIC PEPTIDE: CPT

## 2025-08-23 PROCEDURE — 71275 CT ANGIOGRAPHY CHEST: CPT

## 2025-08-23 PROCEDURE — 93010 ELECTROCARDIOGRAM REPORT: CPT

## 2025-08-23 PROCEDURE — 85025 COMPLETE CBC W/AUTO DIFF WBC: CPT

## 2025-08-23 PROCEDURE — 96374 THER/PROPH/DIAG INJ IV PUSH: CPT

## 2025-08-23 PROCEDURE — 85610 PROTHROMBIN TIME: CPT

## 2025-08-23 PROCEDURE — 93005 ELECTROCARDIOGRAM TRACING: CPT

## 2025-08-23 PROCEDURE — 99285 EMERGENCY DEPT VISIT HI MDM: CPT | Mod: 25

## 2025-08-23 PROCEDURE — 71045 X-RAY EXAM CHEST 1 VIEW: CPT

## 2025-08-23 PROCEDURE — 36415 COLL VENOUS BLD VENIPUNCTURE: CPT

## 2025-08-23 PROCEDURE — 80053 COMPREHEN METABOLIC PANEL: CPT

## 2025-08-23 PROCEDURE — 74177 CT ABD & PELVIS W/CONTRAST: CPT

## 2025-08-23 PROCEDURE — 71045 X-RAY EXAM CHEST 1 VIEW: CPT | Mod: 26

## 2025-08-23 PROCEDURE — 71275 CT ANGIOGRAPHY CHEST: CPT | Mod: 26

## 2025-08-23 PROCEDURE — 74177 CT ABD & PELVIS W/CONTRAST: CPT | Mod: 26

## 2025-08-23 PROCEDURE — 84484 ASSAY OF TROPONIN QUANT: CPT

## 2025-08-23 RX ORDER — ACETAMINOPHEN 500 MG/5ML
650 LIQUID (ML) ORAL ONCE
Refills: 0 | Status: COMPLETED | OUTPATIENT
Start: 2025-08-23 | End: 2025-08-23

## 2025-08-23 RX ORDER — DOXYCYCLINE HYCLATE 100 MG
50 TABLET ORAL ONCE
Refills: 0 | Status: COMPLETED | OUTPATIENT
Start: 2025-08-23 | End: 2025-08-23

## 2025-08-23 RX ORDER — DOXYCYCLINE HYCLATE 100 MG
1 TABLET ORAL
Qty: 10 | Refills: 0
Start: 2025-08-23 | End: 2025-08-27

## 2025-08-23 RX ORDER — METHOCARBAMOL 500 MG/1
500 TABLET, FILM COATED ORAL ONCE
Refills: 0 | Status: COMPLETED | OUTPATIENT
Start: 2025-08-23 | End: 2025-08-23

## 2025-08-23 RX ORDER — LIDOCAINE HYDROCHLORIDE 20 MG/ML
1 JELLY TOPICAL ONCE
Refills: 0 | Status: COMPLETED | OUTPATIENT
Start: 2025-08-23 | End: 2025-08-23

## 2025-08-23 RX ADMIN — METHOCARBAMOL 500 MILLIGRAM(S): 500 TABLET, FILM COATED ORAL at 06:01

## 2025-08-23 RX ADMIN — METHOCARBAMOL 500 MILLIGRAM(S): 500 TABLET, FILM COATED ORAL at 02:10

## 2025-08-23 RX ADMIN — Medication 50 MILLIGRAM(S): at 07:24

## 2025-08-23 RX ADMIN — LIDOCAINE HYDROCHLORIDE 1 PATCH: 20 JELLY TOPICAL at 02:10

## 2025-08-23 RX ADMIN — Medication 650 MILLIGRAM(S): at 06:38

## 2025-08-23 RX ADMIN — Medication 260 MILLIGRAM(S): at 04:49

## 2025-08-27 ENCOUNTER — RX RENEWAL (OUTPATIENT)
Age: 65
End: 2025-08-27

## 2025-09-03 ENCOUNTER — APPOINTMENT (OUTPATIENT)
Dept: ORTHOPEDIC SURGERY | Facility: CLINIC | Age: 65
End: 2025-09-03